# Patient Record
Sex: FEMALE | URBAN - METROPOLITAN AREA
[De-identification: names, ages, dates, MRNs, and addresses within clinical notes are randomized per-mention and may not be internally consistent; named-entity substitution may affect disease eponyms.]

---

## 2023-01-05 ENCOUNTER — APPOINTMENT (OUTPATIENT)
Dept: URBAN - METROPOLITAN AREA CLINIC 267 | Age: 83
Setting detail: DERMATOLOGY
End: 2023-01-05

## 2023-01-05 VITALS — HEIGHT: 65 IN | WEIGHT: 139 LBS

## 2023-01-05 DIAGNOSIS — D49.2 NEOPLASM OF UNSPECIFIED BEHAVIOR OF BONE, SOFT TISSUE, AND SKIN: ICD-10-CM

## 2023-01-05 DIAGNOSIS — L82.1 OTHER SEBORRHEIC KERATOSIS: ICD-10-CM

## 2023-01-05 PROCEDURE — 11102 TANGNTL BX SKIN SINGLE LES: CPT

## 2023-01-05 PROCEDURE — OTHER COUNSELING: OTHER

## 2023-01-05 PROCEDURE — 11103 TANGNTL BX SKIN EA SEP/ADDL: CPT

## 2023-01-05 PROCEDURE — OTHER BIOPSY BY SHAVE METHOD: OTHER

## 2023-01-05 PROCEDURE — 99202 OFFICE O/P NEW SF 15 MIN: CPT | Mod: 25

## 2023-01-05 PROCEDURE — OTHER MIPS QUALITY: OTHER

## 2023-01-05 ASSESSMENT — LOCATION ZONE DERM
LOCATION ZONE: SCALP
LOCATION ZONE: NOSE
LOCATION ZONE: FACE

## 2023-01-05 ASSESSMENT — LOCATION DETAILED DESCRIPTION DERM
LOCATION DETAILED: LEFT SUPERIOR OCCIPITAL SCALP
LOCATION DETAILED: NASAL SUPRATIP
LOCATION DETAILED: RIGHT SUPERIOR FOREHEAD

## 2023-01-05 ASSESSMENT — LOCATION SIMPLE DESCRIPTION DERM
LOCATION SIMPLE: RIGHT FOREHEAD
LOCATION SIMPLE: NOSE
LOCATION SIMPLE: LEFT OCCIPITAL SCALP

## 2023-01-05 NOTE — HPI: SKIN LESIONS
Is This A New Presentation, Or A Follow-Up?: Skin Lesion
Additional History: Pt stated spot broke open and has grown back hard. Pt states has a Hx of BCC.

## 2023-01-05 NOTE — PROCEDURE: BIOPSY BY SHAVE METHOD
Type Of Destruction Used: Curettage
Wound Care: Aquaphor
Destruction After The Procedure: No
Size Of Lesion In Cm: 0.7
Detail Level: Detailed
Electrodesiccation Text: The wound bed was treated with electrodesiccation after the biopsy was performed.
Was A Bandage Applied: Yes
Anesthesia Type: 1% lidocaine with epinephrine
Curettage Text: The wound bed was treated with curettage after the biopsy was performed.
Additional Anesthesia Volume In Cc (Will Not Render If 0): 0
Biopsy Type: H and E
Billing Type: Third-Party Bill
Notification Instructions: Patient will be notified of biopsy results. However, patient instructed to call the office if not contacted within 2 weeks.
Consent: Written consent was obtained and risks were reviewed including but not limited to scarring, infection, bleeding, scabbing, incomplete removal, nerve damage and allergy to anesthesia.
Dressing: bandage
Information: Selecting Yes will display possible errors in your note based on the variables you have selected. This validation is only offered as a suggestion for you. PLEASE NOTE THAT THE VALIDATION TEXT WILL BE REMOVED WHEN YOU FINALIZE YOUR NOTE. IF YOU WANT TO FAX A PRELIMINARY NOTE YOU WILL NEED TO TOGGLE THIS TO 'NO' IF YOU DO NOT WANT IT IN YOUR FAXED NOTE.
Hemostasis: Aluminum Chloride
Silver Nitrate Text: The wound bed was treated with silver nitrate after the biopsy was performed.
Size Of Lesion In Cm: 0.6
Cryotherapy Text: The wound bed was treated with cryotherapy after the biopsy was performed.
Electrodesiccation And Curettage Text: The wound bed was treated with electrodesiccation and curettage after the biopsy was performed.
Post-Care Instructions: I reviewed with the patient in detail post-care instructions. Patient is to keep the biopsy site dry overnight, and then apply Aquaphor twice daily until healed. Patient may apply hydrogen peroxide soaks to remove any crusting.
Anesthesia Volume In Cc (Will Not Render If 0): 0.5
Depth Of Biopsy: dermis
Biopsy Method: Personna blade

## 2023-02-13 ENCOUNTER — APPOINTMENT (OUTPATIENT)
Dept: URBAN - METROPOLITAN AREA CLINIC 268 | Age: 83
Setting detail: DERMATOLOGY
End: 2023-02-17

## 2023-02-13 PROBLEM — C44.311 BASAL CELL CARCINOMA OF SKIN OF NOSE: Status: ACTIVE | Noted: 2023-02-13

## 2023-02-13 PROCEDURE — 77300 RADIATION THERAPY DOSE PLAN: CPT

## 2023-02-13 PROCEDURE — G6001 ECHO GUIDANCE RADIOTHERAPY: HCPCS

## 2023-02-13 PROCEDURE — OTHER TREATMENT REGIMEN: OTHER

## 2023-02-13 PROCEDURE — 77280 THER RAD SIMULAJ FIELD SMPL: CPT

## 2023-02-13 PROCEDURE — OTHER SUPERFICIAL RADIATION TREATMENT: OTHER

## 2023-02-13 PROCEDURE — 77261 THER RADIOLOGY TX PLNG SMPL: CPT

## 2023-02-13 PROCEDURE — 77332 RADIATION TREATMENT AID(S): CPT

## 2023-02-13 PROCEDURE — 99213 OFFICE O/P EST LOW 20 MIN: CPT | Mod: 25

## 2023-02-13 NOTE — PROCEDURE: SUPERFICIAL RADIATION TREATMENT
Bill For Simulation: Yes - (Simple Simulation: 61061) Bill For Simulation?: Yes - (Simple Simulation: 19882)

## 2023-02-13 NOTE — PROCEDURE: SUPERFICIAL RADIATION TREATMENT
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

## 2023-02-13 NOTE — PROCEDURE: TREATMENT REGIMEN
Plan: Physician Orders: Initial Simulation Plan: Simulation with per Fraction Ultrasound\\nPlan: Per the request of Dr. Vincent, the patient was seen today for Superficial Radiation Therapy planning\\nrequiring initial simulation in preparation for treatment of specific diseased sites. Simulation is necessary to\\ndetermine the correct patient and treatment portal positioning, to deliver safe and effective radiation\\ntherapy. A high-frequency ultrasound image was acquired prior to treatment today for two- dimensional\\nevaluation of tumor volume and will be repeated per fraction for response to treatment, in addition, to\\ngeometric accuracy of field placement. Physician evaluation of the ultrasound tumor depth, width, and\\nbreadth will be ongoing through the course of treatment and is deemed medically necessary ensuring\\nefficacy of treatment and determine whether to proceed with delivery of therapeutic. Today’s image and\\nsetup were evaluated to determine the initiation of treatment. All appropriate blocking and treatment\\nparameters were verified by the radiation therapist and provider.\\nUltrasound depth is 2.32 mm.\\nPer Dr. Vincent, continued per fraction ultrasound guidance and simulation are required for field placement,\\nmeasurement of tumor depth, width and breadth, progress, and edema monitoring.\\nPer the request of Dr. Vincent, continuing medical physics review as per radiotherapy standard of care post every\\n5th fraction for the patient, including assessment of treatment parameters,  of dose\\ndelivery, and review of patient treatment documentation in support of the provider, is ordered, ensuring\\nefficacy and continued safe delivery of radiotherapy. Included in the physics check is a review of patient\\nsetup information, all pertinent simulation and treatment photograph(s) checks, prescription, dose\\ncalculation verification, per fraction dose charted correctly, elapsed days and treatment days correctly\\ncharted, cumulative dose correct, and review of any prescription changes. Continued medical physics review\\npost every 5th fraction of radiotherapy is requested by the provider for appropriate radiotherapy\\nmanagement and is deemed medically necessary and standard of care.
Detail Level: Zone

## 2023-02-14 ENCOUNTER — APPOINTMENT (OUTPATIENT)
Dept: URBAN - METROPOLITAN AREA CLINIC 268 | Age: 83
Setting detail: DERMATOLOGY
End: 2023-02-17

## 2023-02-14 PROBLEM — C44.311 BASAL CELL CARCINOMA OF SKIN OF NOSE: Status: ACTIVE | Noted: 2023-02-14

## 2023-02-14 PROCEDURE — OTHER SUPERFICIAL RADIATION TREATMENT: OTHER

## 2023-02-14 PROCEDURE — OTHER TREATMENT REGIMEN: OTHER

## 2023-02-14 PROCEDURE — 77280 THER RAD SIMULAJ FIELD SMPL: CPT

## 2023-02-14 PROCEDURE — G6001 ECHO GUIDANCE RADIOTHERAPY: HCPCS | Mod: XU

## 2023-02-14 PROCEDURE — 77401 RADIATION TX DELIVERY SUPFC: CPT

## 2023-02-14 NOTE — PROCEDURE: TREATMENT REGIMEN
Detail Level: Zone
Plan: Physician Orders: Radiotherapy Treatment Plan: Superficial Radiotherapy with Ultrasound\\nPlan: This patient has been treated today with image-guided superficial radiation therapy for non-melanoma\\nskin cancer. Written informed consent has been previously obtained from this patient for this treatment. This\\nconsent is documented in the patient's chart. The patient gave verbal consent to continue treatment today.\\nThe patient was treated with a specific radiation dose and setup as prescribed by the provider listed on this\\nvisit note. A Radiation Therapist performed administration of radiation under the supervision of a provider.\\nThe treatment parameters and cumulative dose are indicated above. Prior to administering the radiation, the\\npatient underwent a verification therapeutic radiology simulation-aided field setting defining relevant normal\\nand abnormal target anatomy and acquiring images with a separate and distinct diagnostic high-frequency\\nultrasound to delineate tissues and determine whether to proceed with delivery of therapeutic, in addition to\\nretrieve data necessary to develop an optimal radiation treatment process for the patient. The field\\nplacement simulation documents any change seen in overall tumor volume documented in the patient’s\\nrecord, allows the clinician to indicate any needed changes in the treatment plan and/or prescription,\\nprovides diagnostic evaluation as the basis for performing the therapeutic procedure, and clearly identifies\\nthe information needed to decide to proceed with the therapeutic procedure. This process includes\\nverification of the treatment port(s) and proper treatment positioning. All treatment ports were\\nphotographed within electronic medical records. The patient's lead blocking along with gross tumor volume\\nand margin was confirmed. Considering superficial radiotherapy is clinical in setup, this requires the physician\\nand radiation therapist to clarify the location interest being treated against initial images, ultrasound,\\npathology, and patient anatomy. Care was taken to ensure erwin treated were geometrically accurate and\\nproperly positioned using therapeutic radiology simulation-aided field setting verification per fraction. This\\nprocess is also utilized to determine if any prescription or setup changes are necessary. These steps are\\ntherefore medically necessary to ensure safe and effective administration of radiation. Ongoing therapeutic\\nradiology simulation-aided field setting verification is ordered throughout the course of therapy.\\nA high-frequency ultrasound image was acquired today for a two-dimensional evaluation of the tumor\\nvolume, depth, width, breadth, review of prior response to treatment, provide geometric accuracy of field\\nplacement, and determine whether to proceed with therapeutic delivery.\\nHigh frequency ultrasound depth is 2.32 mm, which is 0 mm in difference from previous imaging.\\nThe field placement and ultrasound imaging, per fraction, is separate and distinct from the initial simulation\\nand is an important task in providing safe administration of superficial radiation therapy. Physician evaluation\\nof the ultrasound information will be ongoing throughout the course of treatment and is deemed medically\\nnecessary to ensure the efficacy of treatment, whether to proceed with therapeutic delivery, and determine\\sherrie necessary changes. Today's images were evaluated for determination of continuation of treatment with\\nthe current plan or with necessary changes as appropriate. According to the review of verification\\ntherapeutic radiology simulation-aided field setting and imaging, no change is required.\\nAdditionally, the use of ultrasound visualization and targeted assessment allows the patient to be able to see\\nher cancer progress, encouraging the patient to complete and maintain compliance through the full\\ncourse of prescribed radiotherapy. Per Dr. Vincent, continued ultrasound guidance and therapeutic radiology\\nsimulation-aided field setting verification per fraction is required for field placement, measurement of tumor\\ndepth, tissues evaluation, progress, acute effect monitoring, and determination for therapeutic treatment\\ndelivery is appropriate.\\n

## 2023-02-14 NOTE — PROCEDURE: SUPERFICIAL RADIATION TREATMENT
Bill For Simulation?: Yes - (Simple Simulation: 36598) Bill For Simulation: Yes - (Simple Simulation: 85655)

## 2023-02-15 ENCOUNTER — APPOINTMENT (OUTPATIENT)
Dept: URBAN - METROPOLITAN AREA CLINIC 268 | Age: 83
Setting detail: DERMATOLOGY
End: 2023-02-17

## 2023-02-15 PROBLEM — C44.311 BASAL CELL CARCINOMA OF SKIN OF NOSE: Status: ACTIVE | Noted: 2023-02-15

## 2023-02-15 PROCEDURE — 77401 RADIATION TX DELIVERY SUPFC: CPT

## 2023-02-15 PROCEDURE — OTHER SUPERFICIAL RADIATION TREATMENT: OTHER

## 2023-02-15 PROCEDURE — OTHER TREATMENT REGIMEN: OTHER

## 2023-02-15 PROCEDURE — G6001 ECHO GUIDANCE RADIOTHERAPY: HCPCS | Mod: XU

## 2023-02-15 PROCEDURE — 77280 THER RAD SIMULAJ FIELD SMPL: CPT

## 2023-02-15 NOTE — PROCEDURE: SUPERFICIAL RADIATION TREATMENT
Bill For Simulation?: Yes - (Simple Simulation: 81270) Bill For Simulation: Yes - (Simple Simulation: 42631)

## 2023-02-15 NOTE — PROCEDURE: TREATMENT REGIMEN
Detail Level: Zone
Plan: Physician Orders: Radiotherapy Treatment Plan: Superficial Radiotherapy with Ultrasound\\nPlan: This patient has been treated today with image-guided superficial radiation therapy for non-melanoma\\nskin cancer. Written informed consent has been previously obtained from this patient for this treatment. This\\nconsent is documented in the patient's chart. The patient gave verbal consent to continue treatment today.\\nThe patient was treated with a specific radiation dose and setup as prescribed by the provider listed on this\\nvisit note. A Radiation Therapist performed administration of radiation under the supervision of a provider.\\nThe treatment parameters and cumulative dose are indicated above. Prior to administering the radiation, the\\npatient underwent a verification therapeutic radiology simulation-aided field setting defining relevant normal\\nand abnormal target anatomy and acquiring images with a separate and distinct diagnostic high-frequency\\nultrasound to delineate tissues and determine whether to proceed with delivery of therapeutic, in addition to\\nretrieve data necessary to develop an optimal radiation treatment process for the patient. The field\\nplacement simulation documents any change seen in overall tumor volume documented in the patient’s\\nrecord, allows the clinician to indicate any needed changes in the treatment plan and/or prescription,\\nprovides diagnostic evaluation as the basis for performing the therapeutic procedure, and clearly identifies\\nthe information needed to decide to proceed with the therapeutic procedure. This process includes\\nverification of the treatment port(s) and proper treatment positioning. All treatment ports were\\nphotographed within electronic medical records. The patient's lead blocking along with gross tumor volume\\nand margin was confirmed. Considering superficial radiotherapy is clinical in setup, this requires the physician\\nand radiation therapist to clarify the location interest being treated against initial images, ultrasound,\\npathology, and patient anatomy. Care was taken to ensure erwin treated were geometrically accurate and\\nproperly positioned using therapeutic radiology simulation-aided field setting verification per fraction. This\\nprocess is also utilized to determine if any prescription or setup changes are necessary. These steps are\\ntherefore medically necessary to ensure safe and effective administration of radiation. Ongoing therapeutic\\nradiology simulation-aided field setting verification is ordered throughout the course of therapy.\\nA high-frequency ultrasound image was acquired today for a two-dimensional evaluation of the tumor\\nvolume, depth, width, breadth, review of prior response to treatment, provide geometric accuracy of field\\nplacement, and determine whether to proceed with therapeutic delivery.\\nHigh frequency ultrasound depth is 3.22 mm, which is 0.90 mm in difference from previous imaging.\\nThe field placement and ultrasound imaging, per fraction, is separate and distinct from the initial simulation\\nand is an important task in providing safe administration of superficial radiation therapy. Physician evaluation\\nof the ultrasound information will be ongoing throughout the course of treatment and is deemed medically\\nnecessary to ensure the efficacy of treatment, whether to proceed with therapeutic delivery, and determine\\sherrie necessary changes. Today's images were evaluated for determination of continuation of treatment with\\nthe current plan or with necessary changes as appropriate. According to the review of verification\\ntherapeutic radiology simulation-aided field setting and imaging, no change is required.\\nAdditionally, the use of ultrasound visualization and targeted assessment allows the patient to be able to see\\nher cancer progress, encouraging the patient to complete and maintain compliance through the full\\ncourse of prescribed radiotherapy. Per Dr. Vincent, continued ultrasound guidance and therapeutic radiology\\nsimulation-aided field setting verification per fraction is required for field placement, measurement of tumor\\ndepth, tissues evaluation, progress, acute effect monitoring, and determination for therapeutic treatment\\ndelivery is appropriate.\\n

## 2023-02-21 ENCOUNTER — APPOINTMENT (OUTPATIENT)
Dept: URBAN - METROPOLITAN AREA CLINIC 268 | Age: 83
Setting detail: DERMATOLOGY
End: 2023-02-21

## 2023-02-21 PROBLEM — C44.311 BASAL CELL CARCINOMA OF SKIN OF NOSE: Status: ACTIVE | Noted: 2023-02-21

## 2023-02-21 PROCEDURE — 77280 THER RAD SIMULAJ FIELD SMPL: CPT

## 2023-02-21 PROCEDURE — OTHER TREATMENT REGIMEN: OTHER

## 2023-02-21 PROCEDURE — OTHER SUPERFICIAL RADIATION TREATMENT: OTHER

## 2023-02-21 PROCEDURE — G6001 ECHO GUIDANCE RADIOTHERAPY: HCPCS | Mod: XU

## 2023-02-21 PROCEDURE — 77401 RADIATION TX DELIVERY SUPFC: CPT

## 2023-02-21 NOTE — PROCEDURE: TREATMENT REGIMEN
Plan: Physician Orders: Radiotherapy Treatment Plan: Superficial Radiotherapy with Ultrasound\\nPlan: This patient has been treated today with image-guided superficial radiation therapy for non-melanoma\\nskin cancer. Written informed consent has been previously obtained from this patient for this treatment. This\\nconsent is documented in the patient's chart. The patient gave verbal consent to continue treatment today.\\nThe patient was treated with a specific radiation dose and setup as prescribed by the provider listed on this\\nvisit note. A Radiation Therapist performed administration of radiation under the supervision of a provider.\\nThe treatment parameters and cumulative dose are indicated above. Prior to administering the radiation, the\\npatient underwent a verification therapeutic radiology simulation-aided field setting defining relevant normal\\nand abnormal target anatomy and acquiring images with a separate and distinct diagnostic high-frequency\\nultrasound to delineate tissues and determine whether to proceed with delivery of therapeutic, in addition to\\nretrieve data necessary to develop an optimal radiation treatment process for the patient. The field\\nplacement simulation documents any change seen in overall tumor volume documented in the patient’s\\nrecord, allows the clinician to indicate any needed changes in the treatment plan and/or prescription,\\nprovides diagnostic evaluation as the basis for performing the therapeutic procedure, and clearly identifies\\nthe information needed to decide to proceed with the therapeutic procedure. This process includes\\nverification of the treatment port(s) and proper treatment positioning. All treatment ports were\\nphotographed within electronic medical records. The patient's lead blocking along with gross tumor volume\\nand margin was confirmed. Considering superficial radiotherapy is clinical in setup, this requires the physician\\nand radiation therapist to clarify the location interest being treated against initial images, ultrasound,\\npathology, and patient anatomy. Care was taken to ensure erwin treated were geometrically accurate and\\nproperly positioned using therapeutic radiology simulation-aided field setting verification per fraction. This\\nprocess is also utilized to determine if any prescription or setup changes are necessary. These steps are\\ntherefore medically necessary to ensure safe and effective administration of radiation. Ongoing therapeutic\\nradiology simulation-aided field setting verification is ordered throughout the course of therapy.\\nA high-frequency ultrasound image was acquired today for a two-dimensional evaluation of the tumor\\nvolume, depth, width, breadth, review of prior response to treatment, provide geometric accuracy of field\\nplacement, and determine whether to proceed with therapeutic delivery.\\nHigh frequency ultrasound depth is 2.90 mm, which is 0.32 mm in difference from previous imaging.\\nThe field placement and ultrasound imaging, per fraction, is separate and distinct from the initial simulation\\nand is an important task in providing safe administration of superficial radiation therapy. Physician evaluation\\nof the ultrasound information will be ongoing throughout the course of treatment and is deemed medically\\nnecessary to ensure the efficacy of treatment, whether to proceed with therapeutic delivery, and determine\\sherrie necessary changes. Today's images were evaluated for determination of continuation of treatment with\\nthe current plan or with necessary changes as appropriate. According to the review of verification\\ntherapeutic radiology simulation-aided field setting and imaging, no change is required.\\nAdditionally, the use of ultrasound visualization and targeted assessment allows the patient to be able to see\\nher cancer progress, encouraging the patient to complete and maintain compliance through the full\\ncourse of prescribed radiotherapy. Per Dr. Vincent, continued ultrasound guidance and therapeutic radiology\\nsimulation-aided field setting verification per fraction is required for field placement, measurement of tumor\\ndepth, tissues evaluation, progress, acute effect monitoring, and determination for therapeutic treatment\\ndelivery is appropriate.\\n
Detail Level: Zone

## 2023-02-21 NOTE — PROCEDURE: SUPERFICIAL RADIATION TREATMENT
Bill For Simulation: Yes - (Simple Simulation: 39136) Bill For Simulation?: Yes - (Simple Simulation: 37509)

## 2023-02-22 ENCOUNTER — APPOINTMENT (OUTPATIENT)
Dept: URBAN - METROPOLITAN AREA CLINIC 268 | Age: 83
Setting detail: DERMATOLOGY
End: 2023-02-22

## 2023-02-22 PROBLEM — C44.311 BASAL CELL CARCINOMA OF SKIN OF NOSE: Status: ACTIVE | Noted: 2023-02-22

## 2023-02-22 PROCEDURE — G6001 ECHO GUIDANCE RADIOTHERAPY: HCPCS | Mod: XU

## 2023-02-22 PROCEDURE — OTHER SUPERFICIAL RADIATION TREATMENT: OTHER

## 2023-02-22 PROCEDURE — OTHER TREATMENT REGIMEN: OTHER

## 2023-02-22 PROCEDURE — 77401 RADIATION TX DELIVERY SUPFC: CPT

## 2023-02-22 PROCEDURE — 77280 THER RAD SIMULAJ FIELD SMPL: CPT

## 2023-02-22 NOTE — PROCEDURE: SUPERFICIAL RADIATION TREATMENT
Bill For Simulation: Yes - (Simple Simulation: 16823) Bill For Simulation?: Yes - (Simple Simulation: 97279)

## 2023-02-22 NOTE — PROCEDURE: TREATMENT REGIMEN
Detail Level: Zone
Plan: Physician Orders: Radiotherapy Treatment Plan: Superficial Radiotherapy with Ultrasound\\nPlan: This patient has been treated today with image-guided superficial radiation therapy for non-melanoma\\nskin cancer. Written informed consent has been previously obtained from this patient for this treatment. This\\nconsent is documented in the patient's chart. The patient gave verbal consent to continue treatment today.\\nThe patient was treated with a specific radiation dose and setup as prescribed by the provider listed on this\\nvisit note. A Radiation Therapist performed administration of radiation under the supervision of a provider.\\nThe treatment parameters and cumulative dose are indicated above. Prior to administering the radiation, the\\npatient underwent a verification therapeutic radiology simulation-aided field setting defining relevant normal\\nand abnormal target anatomy and acquiring images with a separate and distinct diagnostic high-frequency\\nultrasound to delineate tissues and determine whether to proceed with delivery of therapeutic, in addition to\\nretrieve data necessary to develop an optimal radiation treatment process for the patient. The field\\nplacement simulation documents any change seen in overall tumor volume documented in the patient’s\\nrecord, allows the clinician to indicate any needed changes in the treatment plan and/or prescription,\\nprovides diagnostic evaluation as the basis for performing the therapeutic procedure, and clearly identifies\\nthe information needed to decide to proceed with the therapeutic procedure. This process includes\\nverification of the treatment port(s) and proper treatment positioning. All treatment ports were\\nphotographed within electronic medical records. The patient's lead blocking along with gross tumor volume\\nand margin was confirmed. Considering superficial radiotherapy is clinical in setup, this requires the physician\\nand radiation therapist to clarify the location interest being treated against initial images, ultrasound,\\npathology, and patient anatomy. Care was taken to ensure erwin treated were geometrically accurate and\\nproperly positioned using therapeutic radiology simulation-aided field setting verification per fraction. This\\nprocess is also utilized to determine if any prescription or setup changes are necessary. These steps are\\ntherefore medically necessary to ensure safe and effective administration of radiation. Ongoing therapeutic\\nradiology simulation-aided field setting verification is ordered throughout the course of therapy.\\nA high-frequency ultrasound image was acquired today for a two-dimensional evaluation of the tumor\\nvolume, depth, width, breadth, review of prior response to treatment, provide geometric accuracy of field\\nplacement, and determine whether to proceed with therapeutic delivery.\\nHigh frequency ultrasound depth is 2.76 mm, which is 0.14 mm in difference from previous imaging.\\nThe field placement and ultrasound imaging, per fraction, is separate and distinct from the initial simulation\\nand is an important task in providing safe administration of superficial radiation therapy. Physician evaluation\\nof the ultrasound information will be ongoing throughout the course of treatment and is deemed medically\\nnecessary to ensure the efficacy of treatment, whether to proceed with therapeutic delivery, and determine\\sherrie necessary changes. Today's images were evaluated for determination of continuation of treatment with\\nthe current plan or with necessary changes as appropriate. According to the review of verification\\ntherapeutic radiology simulation-aided field setting and imaging, no change is required.\\nAdditionally, the use of ultrasound visualization and targeted assessment allows the patient to be able to see\\nher cancer progress, encouraging the patient to complete and maintain compliance through the full\\ncourse of prescribed radiotherapy. Per Dr. Vincent, continued ultrasound guidance and therapeutic radiology\\nsimulation-aided field setting verification per fraction is required for field placement, measurement of tumor\\ndepth, tissues evaluation, progress, acute effect monitoring, and determination for therapeutic treatment\\ndelivery is appropriate.\\n

## 2023-02-23 ENCOUNTER — APPOINTMENT (OUTPATIENT)
Dept: URBAN - METROPOLITAN AREA CLINIC 268 | Age: 83
Setting detail: DERMATOLOGY
End: 2023-02-24

## 2023-02-23 PROBLEM — C44.311 BASAL CELL CARCINOMA OF SKIN OF NOSE: Status: ACTIVE | Noted: 2023-02-23

## 2023-02-23 PROCEDURE — 77280 THER RAD SIMULAJ FIELD SMPL: CPT

## 2023-02-23 PROCEDURE — G6001 ECHO GUIDANCE RADIOTHERAPY: HCPCS | Mod: XU

## 2023-02-23 PROCEDURE — 77401 RADIATION TX DELIVERY SUPFC: CPT

## 2023-02-23 PROCEDURE — 77427 RADIATION TX MANAGEMENT X5: CPT

## 2023-02-23 PROCEDURE — OTHER TREATMENT REGIMEN: OTHER

## 2023-02-23 PROCEDURE — OTHER SUPERFICIAL RADIATION TREATMENT: OTHER

## 2023-02-23 NOTE — PROCEDURE: TREATMENT REGIMEN
Detail Level: Zone
Plan: Physician Orders: Radiotherapy Treatment Plan: Superficial Radiotherapy with Ultrasound\\nPlan: This patient has been treated today with image-guided superficial radiation therapy for non-melanoma\\nskin cancer. Written informed consent has been previously obtained from this patient for this treatment. This\\nconsent is documented in the patient's chart. The patient gave verbal consent to continue treatment today.\\nThe patient was treated with a specific radiation dose and setup as prescribed by the provider listed on this\\nvisit note. A Radiation Therapist performed administration of radiation under the supervision of a provider.\\nThe treatment parameters and cumulative dose are indicated above. Prior to administering the radiation, the\\npatient underwent a verification therapeutic radiology simulation-aided field setting defining relevant normal\\nand abnormal target anatomy and acquiring images with a separate and distinct diagnostic high-frequency\\nultrasound to delineate tissues and determine whether to proceed with delivery of therapeutic, in addition to\\nretrieve data necessary to develop an optimal radiation treatment process for the patient. The field\\nplacement simulation documents any change seen in overall tumor volume documented in the patient’s\\nrecord, allows the clinician to indicate any needed changes in the treatment plan and/or prescription,\\nprovides diagnostic evaluation as the basis for performing the therapeutic procedure, and clearly identifies\\nthe information needed to decide to proceed with the therapeutic procedure. This process includes\\nverification of the treatment port(s) and proper treatment positioning. All treatment ports were\\nphotographed within electronic medical records. The patient's lead blocking along with gross tumor volume\\nand margin was confirmed. Considering superficial radiotherapy is clinical in setup, this requires the physician\\nand radiation therapist to clarify the location interest being treated against initial images, ultrasound,\\npathology, and patient anatomy. Care was taken to ensure erwin treated were geometrically accurate and\\nproperly positioned using therapeutic radiology simulation-aided field setting verification per fraction. This\\nprocess is also utilized to determine if any prescription or setup changes are necessary. These steps are\\ntherefore medically necessary to ensure safe and effective administration of radiation. Ongoing therapeutic\\nradiology simulation-aided field setting verification is ordered throughout the course of therapy.\\nA high-frequency ultrasound image was acquired today for a two-dimensional evaluation of the tumor\\nvolume, depth, width, breadth, review of prior response to treatment, provide geometric accuracy of field\\nplacement, and determine whether to proceed with therapeutic delivery.\\nHigh frequency ultrasound depth is 2.64 mm, which is 0.12 mm in difference from previous imaging.\\nThe field placement and ultrasound imaging, per fraction, is separate and distinct from the initial simulation\\nand is an important task in providing safe administration of superficial radiation therapy. Physician evaluation\\nof the ultrasound information will be ongoing throughout the course of treatment and is deemed medically\\nnecessary to ensure the efficacy of treatment, whether to proceed with therapeutic delivery, and determine\\sherrie necessary changes. Today's images were evaluated for determination of continuation of treatment with\\nthe current plan or with necessary changes as appropriate. According to the review of verification\\ntherapeutic radiology simulation-aided field setting and imaging, no change is required.\\nAdditionally, the use of ultrasound visualization and targeted assessment allows the patient to be able to see\\nher cancer progress, encouraging the patient to complete and maintain compliance through the full\\ncourse of prescribed radiotherapy. Per Dr. Vincent, continued ultrasound guidance and therapeutic radiology\\nsimulation-aided field setting verification per fraction is required for field placement, measurement of tumor\\ndepth, tissues evaluation, progress, acute effect monitoring, and determination for therapeutic treatment\\ndelivery is appropriate.\\n\\nPhysician Orders: Plan: On Treatment Visit (OTV) with Ultrasound\\nPlan: The patient is undergoing superficial radiation therapy for skin cancer and presents for weekly\\nevaluation and management. Per protocol and as documented on the flow sheet, the patient was questioned\\cristal to subjective redness, pruritus, pain, drainage, fatigue, or any other symptoms. Objectively, the radiation\\narea was evaluated with regards to erythema, atrophy, scale, crusting, erosion, ulceration, edema, purpura,\\ntenderness, warmth, drainage, and any other findings. The plan was extensively reviewed including dose and\\ndosing schedule. The simulation and clinical setup were also reviewed as were external and any internal\\nshields and based on this review the appropriateness and sufficiency of treatment was determined.\\nA high-frequency ultrasound image was acquired today for a two-dimensional evaluation of the tumor\\nvolume, depth, width, breadth, review of prior response to treatment, provide geometric accuracy of field\\nplacement, and determine whether to proceed with therapeutic delivery.\\nHigh frequency ultrasound depth is 2.64 mm, which is 0.12 mm in difference from previous imaging.\\nPer Dr. Vincent, continued ultrasound guidance and therapeutic radiology simulation-aided field setting\\nverification per fraction is required for field placement, measurement of tumor depth, tissues evaluation,\\nprogress, acute effect monitoring, and determination for therapeutic treatment delivery is appropriate.

## 2023-02-23 NOTE — PROCEDURE: SUPERFICIAL RADIATION TREATMENT
Bill For Simulation?: Yes - (Simple Simulation: [id number]) Bill For Simulation: Yes - (Simple Simulation: 40388)

## 2023-02-25 ENCOUNTER — APPOINTMENT (OUTPATIENT)
Dept: URBAN - METROPOLITAN AREA CLINIC 268 | Age: 83
Setting detail: DERMATOLOGY
End: 2023-04-11

## 2023-02-25 PROCEDURE — 77336 RADIATION PHYSICS CONSULT: CPT

## 2023-02-28 ENCOUNTER — APPOINTMENT (OUTPATIENT)
Dept: URBAN - METROPOLITAN AREA CLINIC 268 | Age: 83
Setting detail: DERMATOLOGY
End: 2023-03-02

## 2023-02-28 PROBLEM — C44.311 BASAL CELL CARCINOMA OF SKIN OF NOSE: Status: ACTIVE | Noted: 2023-02-28

## 2023-02-28 PROCEDURE — 77280 THER RAD SIMULAJ FIELD SMPL: CPT

## 2023-02-28 PROCEDURE — G6001 ECHO GUIDANCE RADIOTHERAPY: HCPCS | Mod: XU

## 2023-02-28 PROCEDURE — OTHER TREATMENT REGIMEN: OTHER

## 2023-02-28 PROCEDURE — OTHER SUPERFICIAL RADIATION TREATMENT: OTHER

## 2023-02-28 PROCEDURE — 77401 RADIATION TX DELIVERY SUPFC: CPT

## 2023-02-28 NOTE — PROCEDURE: TREATMENT REGIMEN
Detail Level: Zone
Plan: Physician Orders: Radiotherapy Treatment Plan: Superficial Radiotherapy with Ultrasound\\nPlan: This patient has been treated today with image-guided superficial radiation therapy for non-melanoma\\nskin cancer. Written informed consent has been previously obtained from this patient for this treatment. This\\nconsent is documented in the patient's chart. The patient gave verbal consent to continue treatment today.\\nThe patient was treated with a specific radiation dose and setup as prescribed by the provider listed on this\\nvisit note. A Radiation Therapist performed administration of radiation under the supervision of a provider.\\nThe treatment parameters and cumulative dose are indicated above. Prior to administering the radiation, the\\npatient underwent a verification therapeutic radiology simulation-aided field setting defining relevant normal\\nand abnormal target anatomy and acquiring images with a separate and distinct diagnostic high-frequency\\nultrasound to delineate tissues and determine whether to proceed with delivery of therapeutic, in addition to\\nretrieve data necessary to develop an optimal radiation treatment process for the patient. The field\\nplacement simulation documents any change seen in overall tumor volume documented in the patient’s\\nrecord, allows the clinician to indicate any needed changes in the treatment plan and/or prescription,\\nprovides diagnostic evaluation as the basis for performing the therapeutic procedure, and clearly identifies\\nthe information needed to decide to proceed with the therapeutic procedure. This process includes\\nverification of the treatment port(s) and proper treatment positioning. All treatment ports were\\nphotographed within electronic medical records. The patient's lead blocking along with gross tumor volume\\nand margin was confirmed. Considering superficial radiotherapy is clinical in setup, this requires the physician\\nand radiation therapist to clarify the location interest being treated against initial images, ultrasound,\\npathology, and patient anatomy. Care was taken to ensure erwin treated were geometrically accurate and\\nproperly positioned using therapeutic radiology simulation-aided field setting verification per fraction. This\\nprocess is also utilized to determine if any prescription or setup changes are necessary. These steps are\\ntherefore medically necessary to ensure safe and effective administration of radiation. Ongoing therapeutic\\nradiology simulation-aided field setting verification is ordered throughout the course of therapy.\\nA high-frequency ultrasound image was acquired today for a two-dimensional evaluation of the tumor\\nvolume, depth, width, breadth, review of prior response to treatment, provide geometric accuracy of field\\nplacement, and determine whether to proceed with therapeutic delivery.\\nHigh frequency ultrasound depth is 2.30 mm, which is -0.34 mm in difference from previous imaging.\\nThe field placement and ultrasound imaging, per fraction, is separate and distinct from the initial simulation\\nand is an important task in providing safe administration of superficial radiation therapy. Physician evaluation\\nof the ultrasound information will be ongoing throughout the course of treatment and is deemed medically\\nnecessary to ensure the efficacy of treatment, whether to proceed with therapeutic delivery, and determine\\sherrie necessary changes. Today's images were evaluated for determination of continuation of treatment with\\nthe current plan or with necessary changes as appropriate. According to the review of verification\\ntherapeutic radiology simulation-aided field setting and imaging, no change is required.\\nAdditionally, the use of ultrasound visualization and targeted assessment allows the patient to be able to see\\nher cancer progress, encouraging the patient to complete and maintain compliance through the full\\ncourse of prescribed radiotherapy. Per Dr. Vincent, continued ultrasound guidance and therapeutic radiology\\nsimulation-aided field setting verification per fraction is required for field placement, measurement of tumor\\ndepth, tissues evaluation, progress, acute effect monitoring, and determination for therapeutic treatment\\ndelivery is appropriate.\\n

## 2023-02-28 NOTE — PROCEDURE: SUPERFICIAL RADIATION TREATMENT
Bill For Simulation?: Yes - (Simple Simulation: 96306) Bill For Simulation: Yes - (Simple Simulation: 99308)

## 2023-03-01 ENCOUNTER — APPOINTMENT (OUTPATIENT)
Dept: URBAN - METROPOLITAN AREA CLINIC 268 | Age: 83
Setting detail: DERMATOLOGY
End: 2023-03-01

## 2023-03-01 PROBLEM — C44.311 BASAL CELL CARCINOMA OF SKIN OF NOSE: Status: ACTIVE | Noted: 2023-03-01

## 2023-03-01 PROCEDURE — G6001 ECHO GUIDANCE RADIOTHERAPY: HCPCS | Mod: XU

## 2023-03-01 PROCEDURE — OTHER TREATMENT REGIMEN: OTHER

## 2023-03-01 PROCEDURE — 77401 RADIATION TX DELIVERY SUPFC: CPT

## 2023-03-01 PROCEDURE — 77280 THER RAD SIMULAJ FIELD SMPL: CPT

## 2023-03-01 PROCEDURE — OTHER SUPERFICIAL RADIATION TREATMENT: OTHER

## 2023-03-01 NOTE — PROCEDURE: SUPERFICIAL RADIATION TREATMENT
Bill For Simulation?: Yes - (Simple Simulation: [id number]) Bill For Simulation: Yes - (Simple Simulation: 71309)

## 2023-03-02 ENCOUNTER — APPOINTMENT (OUTPATIENT)
Dept: URBAN - METROPOLITAN AREA CLINIC 268 | Age: 83
Setting detail: DERMATOLOGY
End: 2023-03-06

## 2023-03-02 PROBLEM — C44.311 BASAL CELL CARCINOMA OF SKIN OF NOSE: Status: ACTIVE | Noted: 2023-03-02

## 2023-03-02 PROCEDURE — 77280 THER RAD SIMULAJ FIELD SMPL: CPT

## 2023-03-02 PROCEDURE — 77401 RADIATION TX DELIVERY SUPFC: CPT

## 2023-03-02 PROCEDURE — OTHER TREATMENT REGIMEN: OTHER

## 2023-03-02 PROCEDURE — OTHER SUPERFICIAL RADIATION TREATMENT: OTHER

## 2023-03-02 PROCEDURE — G6001 ECHO GUIDANCE RADIOTHERAPY: HCPCS | Mod: XU

## 2023-03-02 NOTE — PROCEDURE: SUPERFICIAL RADIATION TREATMENT
Bill For Simulation: Yes - (Simple Simulation: 83830) Bill For Simulation?: Yes - (Simple Simulation: 19101)

## 2023-03-02 NOTE — PROCEDURE: TREATMENT REGIMEN
Detail Level: Zone
Plan: Physician Orders: Radiotherapy Treatment Plan: Superficial Radiotherapy with Ultrasound\\nPlan: This patient has been treated today with image-guided superficial radiation therapy for non-melanoma\\nskin cancer. Written informed consent has been previously obtained from this patient for this treatment. This\\nconsent is documented in the patient's chart. The patient gave verbal consent to continue treatment today.\\nThe patient was treated with a specific radiation dose and setup as prescribed by the provider listed on this\\nvisit note. A Radiation Therapist performed administration of radiation under the supervision of a provider.\\nThe treatment parameters and cumulative dose are indicated above. Prior to administering the radiation, the\\npatient underwent a verification therapeutic radiology simulation-aided field setting defining relevant normal\\nand abnormal target anatomy and acquiring images with a separate and distinct diagnostic high-frequency\\nultrasound to delineate tissues and determine whether to proceed with delivery of therapeutic, in addition to\\nretrieve data necessary to develop an optimal radiation treatment process for the patient. The field\\nplacement simulation documents any change seen in overall tumor volume documented in the patient’s\\nrecord, allows the clinician to indicate any needed changes in the treatment plan and/or prescription,\\nprovides diagnostic evaluation as the basis for performing the therapeutic procedure, and clearly identifies\\nthe information needed to decide to proceed with the therapeutic procedure. This process includes\\nverification of the treatment port(s) and proper treatment positioning. All treatment ports were\\nphotographed within electronic medical records. The patient's lead blocking along with gross tumor volume\\nand margin was confirmed. Considering superficial radiotherapy is clinical in setup, this requires the physician\\nand radiation therapist to clarify the location interest being treated against initial images, ultrasound,\\npathology, and patient anatomy. Care was taken to ensure erwin treated were geometrically accurate and\\nproperly positioned using therapeutic radiology simulation-aided field setting verification per fraction. This\\nprocess is also utilized to determine if any prescription or setup changes are necessary. These steps are\\ntherefore medically necessary to ensure safe and effective administration of radiation. Ongoing therapeutic\\nradiology simulation-aided field setting verification is ordered throughout the course of therapy.\\nA high-frequency ultrasound image was acquired today for a two-dimensional evaluation of the tumor\\nvolume, depth, width, breadth, review of prior response to treatment, provide geometric accuracy of field\\nplacement, and determine whether to proceed with therapeutic delivery.\\nHigh frequency ultrasound depth is 2.44 mm, which is +0.19 mm in difference from previous imaging.\\nThe field placement and ultrasound imaging, per fraction, is separate and distinct from the initial simulation\\nand is an important task in providing safe administration of superficial radiation therapy. Physician evaluation\\nof the ultrasound information will be ongoing throughout the course of treatment and is deemed medically\\nnecessary to ensure the efficacy of treatment, whether to proceed with therapeutic delivery, and determine\\sherrie necessary changes. Today's images were evaluated for determination of continuation of treatment with\\nthe current plan or with necessary changes as appropriate. According to the review of verification\\ntherapeutic radiology simulation-aided field setting and imaging, no change is required.\\nAdditionally, the use of ultrasound visualization and targeted assessment allows the patient to be able to see\\nher cancer progress, encouraging the patient to complete and maintain compliance through the full\\ncourse of prescribed radiotherapy. Per Dr. Vincent, continued ultrasound guidance and therapeutic radiology\\nsimulation-aided field setting verification per fraction is required for field placement, measurement of tumor\\ndepth, tissues evaluation, progress, acute effect monitoring, and determination for therapeutic treatment\\ndelivery is appropriate.\\n

## 2023-03-07 ENCOUNTER — APPOINTMENT (OUTPATIENT)
Dept: URBAN - METROPOLITAN AREA CLINIC 268 | Age: 83
Setting detail: DERMATOLOGY
End: 2023-03-08

## 2023-03-07 PROBLEM — C44.311 BASAL CELL CARCINOMA OF SKIN OF NOSE: Status: ACTIVE | Noted: 2023-03-07

## 2023-03-07 PROCEDURE — OTHER TREATMENT REGIMEN: OTHER

## 2023-03-07 PROCEDURE — OTHER SUPERFICIAL RADIATION TREATMENT: OTHER

## 2023-03-07 PROCEDURE — 77280 THER RAD SIMULAJ FIELD SMPL: CPT

## 2023-03-07 PROCEDURE — 77401 RADIATION TX DELIVERY SUPFC: CPT

## 2023-03-07 PROCEDURE — G6001 ECHO GUIDANCE RADIOTHERAPY: HCPCS | Mod: XU

## 2023-03-07 NOTE — PROCEDURE: SUPERFICIAL RADIATION TREATMENT
Bill For Simulation: Yes - (Simple Simulation: 32810) Bill For Simulation?: Yes - (Simple Simulation: 09449)

## 2023-03-07 NOTE — PROCEDURE: TREATMENT REGIMEN
Detail Level: Zone
Plan: Physician Orders: Radiotherapy Treatment Plan: Superficial Radiotherapy with Ultrasound\\nPlan: This patient has been treated today with image-guided superficial radiation therapy for non-melanoma\\nskin cancer. Written informed consent has been previously obtained from this patient for this treatment. This\\nconsent is documented in the patient's chart. The patient gave verbal consent to continue treatment today.\\nThe patient was treated with a specific radiation dose and setup as prescribed by the provider listed on this\\nvisit note. A Radiation Therapist performed administration of radiation under the supervision of a provider.\\nThe treatment parameters and cumulative dose are indicated above. Prior to administering the radiation, the\\npatient underwent a verification therapeutic radiology simulation-aided field setting defining relevant normal\\nand abnormal target anatomy and acquiring images with a separate and distinct diagnostic high-frequency\\nultrasound to delineate tissues and determine whether to proceed with delivery of therapeutic, in addition to\\nretrieve data necessary to develop an optimal radiation treatment process for the patient. The field\\nplacement simulation documents any change seen in overall tumor volume documented in the patient’s\\nrecord, allows the clinician to indicate any needed changes in the treatment plan and/or prescription,\\nprovides diagnostic evaluation as the basis for performing the therapeutic procedure, and clearly identifies\\nthe information needed to decide to proceed with the therapeutic procedure. This process includes\\nverification of the treatment port(s) and proper treatment positioning. All treatment ports were\\nphotographed within electronic medical records. The patient's lead blocking along with gross tumor volume\\nand margin was confirmed. Considering superficial radiotherapy is clinical in setup, this requires the physician\\nand radiation therapist to clarify the location interest being treated against initial images, ultrasound,\\npathology, and patient anatomy. Care was taken to ensure erwin treated were geometrically accurate and\\nproperly positioned using therapeutic radiology simulation-aided field setting verification per fraction. This\\nprocess is also utilized to determine if any prescription or setup changes are necessary. These steps are\\ntherefore medically necessary to ensure safe and effective administration of radiation. Ongoing therapeutic\\nradiology simulation-aided field setting verification is ordered throughout the course of therapy.\\nA high-frequency ultrasound image was acquired today for a two-dimensional evaluation of the tumor\\nvolume, depth, width, breadth, review of prior response to treatment, provide geometric accuracy of field\\nplacement, and determine whether to proceed with therapeutic delivery.\\nHigh frequency ultrasound depth is 2.63 mm, which is +0.19 mm in difference from previous imaging.\\nThe field placement and ultrasound imaging, per fraction, is separate and distinct from the initial simulation\\nand is an important task in providing safe administration of superficial radiation therapy. Physician evaluation\\nof the ultrasound information will be ongoing throughout the course of treatment and is deemed medically\\nnecessary to ensure the efficacy of treatment, whether to proceed with therapeutic delivery, and determine\\sherrie necessary changes. Today's images were evaluated for determination of continuation of treatment with\\nthe current plan or with necessary changes as appropriate. According to the review of verification\\ntherapeutic radiology simulation-aided field setting and imaging, no change is required.\\nAdditionally, the use of ultrasound visualization and targeted assessment allows the patient to be able to see\\nher cancer progress, encouraging the patient to complete and maintain compliance through the full\\ncourse of prescribed radiotherapy. Per Dr. Vincent, continued ultrasound guidance and therapeutic radiology\\nsimulation-aided field setting verification per fraction is required for field placement, measurement of tumor\\ndepth, tissues evaluation, progress, acute effect monitoring, and determination for therapeutic treatment\\ndelivery is appropriate.\\n

## 2023-03-08 ENCOUNTER — APPOINTMENT (OUTPATIENT)
Dept: URBAN - METROPOLITAN AREA CLINIC 268 | Age: 83
Setting detail: DERMATOLOGY
End: 2023-03-08

## 2023-03-08 PROBLEM — C44.311 BASAL CELL CARCINOMA OF SKIN OF NOSE: Status: ACTIVE | Noted: 2023-03-08

## 2023-03-08 PROCEDURE — 77280 THER RAD SIMULAJ FIELD SMPL: CPT

## 2023-03-08 PROCEDURE — OTHER TREATMENT REGIMEN: OTHER

## 2023-03-08 PROCEDURE — G6001 ECHO GUIDANCE RADIOTHERAPY: HCPCS | Mod: XU

## 2023-03-08 PROCEDURE — 77427 RADIATION TX MANAGEMENT X5: CPT

## 2023-03-08 PROCEDURE — 77401 RADIATION TX DELIVERY SUPFC: CPT

## 2023-03-08 PROCEDURE — OTHER SUPERFICIAL RADIATION TREATMENT: OTHER

## 2023-03-08 NOTE — PROCEDURE: SUPERFICIAL RADIATION TREATMENT
Bill For Simulation?: Yes - (Simple Simulation: 81751) Bill For Simulation: Yes - (Simple Simulation: 61553)

## 2023-03-08 NOTE — PROCEDURE: TREATMENT REGIMEN
Detail Level: Zone
Plan: Physician Orders: Radiotherapy Treatment Plan: Superficial Radiotherapy with Ultrasound\\nPlan: This patient has been treated today with image-guided superficial radiation therapy for non-melanoma\\nskin cancer. Written informed consent has been previously obtained from this patient for this treatment. This\\nconsent is documented in the patient's chart. The patient gave verbal consent to continue treatment today.\\nThe patient was treated with a specific radiation dose and setup as prescribed by the provider listed on this\\nvisit note. A Radiation Therapist performed administration of radiation under the supervision of a provider.\\nThe treatment parameters and cumulative dose are indicated above. Prior to administering the radiation, the\\npatient underwent a verification therapeutic radiology simulation-aided field setting defining relevant normal\\nand abnormal target anatomy and acquiring images with a separate and distinct diagnostic high-frequency\\nultrasound to delineate tissues and determine whether to proceed with delivery of therapeutic, in addition to\\nretrieve data necessary to develop an optimal radiation treatment process for the patient. The field\\nplacement simulation documents any change seen in overall tumor volume documented in the patient’s\\nrecord, allows the clinician to indicate any needed changes in the treatment plan and/or prescription,\\nprovides diagnostic evaluation as the basis for performing the therapeutic procedure, and clearly identifies\\nthe information needed to decide to proceed with the therapeutic procedure. This process includes\\nverification of the treatment port(s) and proper treatment positioning. All treatment ports were\\nphotographed within electronic medical records. The patient's lead blocking along with gross tumor volume\\nand margin was confirmed. Considering superficial radiotherapy is clinical in setup, this requires the physician\\nand radiation therapist to clarify the location interest being treated against initial images, ultrasound,\\npathology, and patient anatomy. Care was taken to ensure erwin treated were geometrically accurate and\\nproperly positioned using therapeutic radiology simulation-aided field setting verification per fraction. This\\nprocess is also utilized to determine if any prescription or setup changes are necessary. These steps are\\ntherefore medically necessary to ensure safe and effective administration of radiation. Ongoing therapeutic\\nradiology simulation-aided field setting verification is ordered throughout the course of therapy.\\nA high-frequency ultrasound image was acquired today for a two-dimensional evaluation of the tumor\\nvolume, depth, width, breadth, review of prior response to treatment, provide geometric accuracy of field\\nplacement, and determine whether to proceed with therapeutic delivery.\\nHigh frequency ultrasound depth is 2.44 mm, which is -0.19 mm in difference from previous imaging.\\nThe field placement and ultrasound imaging, per fraction, is separate and distinct from the initial simulation\\nand is an important task in providing safe administration of superficial radiation therapy. Physician evaluation\\nof the ultrasound information will be ongoing throughout the course of treatment and is deemed medically\\nnecessary to ensure the efficacy of treatment, whether to proceed with therapeutic delivery, and determine\\sherrie necessary changes. Today's images were evaluated for determination of continuation of treatment with\\nthe current plan or with necessary changes as appropriate. According to the review of verification\\ntherapeutic radiology simulation-aided field setting and imaging, no change is required.\\nAdditionally, the use of ultrasound visualization and targeted assessment allows the patient to be able to see\\nher cancer progress, encouraging the patient to complete and maintain compliance through the full\\ncourse of prescribed radiotherapy. Per Dr. Vincent, continued ultrasound guidance and therapeutic radiology\\nsimulation-aided field setting verification per fraction is required for field placement, measurement of tumor\\ndepth, tissues evaluation, progress, acute effect monitoring, and determination for therapeutic treatment\\ndelivery is appropriate.\\n\\nPhysician Orders: Plan: On Treatment Visit (OTV) with Ultrasound\\nPlan: The patient is undergoing superficial radiation therapy for skin cancer and presents for weekly\\nevaluation and management. Per protocol and as documented on the flow sheet, the patient was questioned\\cristal to subjective redness, pruritus, pain, drainage, fatigue, or any other symptoms. Objectively, the radiation\\narea was evaluated with regards to erythema, atrophy, scale, crusting, erosion, ulceration, edema, purpura,\\ntenderness, warmth, drainage, and any other findings. The plan was extensively reviewed including dose and\\ndosing schedule. The simulation and clinical setup were also reviewed as were external and any internal\\nshields and based on this review the appropriateness and sufficiency of treatment was determined.\\nA high-frequency ultrasound image was acquired today for a two-dimensional evaluation of the tumor\\nvolume, depth, width, breadth, review of prior response to treatment, provide geometric accuracy of field\\nplacement, and determine whether to proceed with therapeutic delivery.\\nHigh frequency ultrasound depth is 2.44 mm, which is 0.19 mm in difference from previous imaging.\\nPer Dr. Vincent, continued ultrasound guidance and therapeutic radiology simulation-aided field setting\\nverification per fraction is required for field placement, measurement of tumor depth, tissues evaluation,\\nprogress, acute effect monitoring, and determination for therapeutic treatment delivery is appropriate

## 2023-03-09 ENCOUNTER — APPOINTMENT (OUTPATIENT)
Dept: URBAN - METROPOLITAN AREA CLINIC 268 | Age: 83
Setting detail: DERMATOLOGY
End: 2023-03-10

## 2023-03-09 PROBLEM — C44.311 BASAL CELL CARCINOMA OF SKIN OF NOSE: Status: ACTIVE | Noted: 2023-03-09

## 2023-03-09 PROCEDURE — OTHER TREATMENT REGIMEN: OTHER

## 2023-03-09 PROCEDURE — G6001 ECHO GUIDANCE RADIOTHERAPY: HCPCS | Mod: XU

## 2023-03-09 PROCEDURE — OTHER SUPERFICIAL RADIATION TREATMENT: OTHER

## 2023-03-09 PROCEDURE — 77401 RADIATION TX DELIVERY SUPFC: CPT

## 2023-03-09 PROCEDURE — 77280 THER RAD SIMULAJ FIELD SMPL: CPT

## 2023-03-09 NOTE — PROCEDURE: TREATMENT REGIMEN
Plan: Physician Orders: Radiotherapy Treatment Plan: Superficial Radiotherapy with Ultrasound\\nPlan: This patient has been treated today with image-guided superficial radiation therapy for non-melanoma\\nskin cancer. Written informed consent has been previously obtained from this patient for this treatment. This\\nconsent is documented in the patient's chart. The patient gave verbal consent to continue treatment today.\\nThe patient was treated with a specific radiation dose and setup as prescribed by the provider listed on this\\nvisit note. A Radiation Therapist performed administration of radiation under the supervision of a provider.\\nThe treatment parameters and cumulative dose are indicated above. Prior to administering the radiation, the\\npatient underwent a verification therapeutic radiology simulation-aided field setting defining relevant normal\\nand abnormal target anatomy and acquiring images with a separate and distinct diagnostic high-frequency\\nultrasound to delineate tissues and determine whether to proceed with delivery of therapeutic, in addition to\\nretrieve data necessary to develop an optimal radiation treatment process for the patient. The field\\nplacement simulation documents any change seen in overall tumor volume documented in the patient’s\\nrecord, allows the clinician to indicate any needed changes in the treatment plan and/or prescription,\\nprovides diagnostic evaluation as the basis for performing the therapeutic procedure, and clearly identifies\\nthe information needed to decide to proceed with the therapeutic procedure. This process includes\\nverification of the treatment port(s) and proper treatment positioning. All treatment ports were\\nphotographed within electronic medical records. The patient's lead blocking along with gross tumor volume\\nand margin was confirmed. Considering superficial radiotherapy is clinical in setup, this requires the physician\\nand radiation therapist to clarify the location interest being treated against initial images, ultrasound,\\npathology, and patient anatomy. Care was taken to ensure erwin treated were geometrically accurate and\\nproperly positioned using therapeutic radiology simulation-aided field setting verification per fraction. This\\nprocess is also utilized to determine if any prescription or setup changes are necessary. These steps are\\ntherefore medically necessary to ensure safe and effective administration of radiation. Ongoing therapeutic\\nradiology simulation-aided field setting verification is ordered throughout the course of therapy.\\nA high-frequency ultrasound image was acquired today for a two-dimensional evaluation of the tumor\\nvolume, depth, width, breadth, review of prior response to treatment, provide geometric accuracy of field\\nplacement, and determine whether to proceed with therapeutic delivery.\\nHigh frequency ultrasound depth is 2.72 mm, which is +0.28 mm in difference from previous imaging.\\nThe field placement and ultrasound imaging, per fraction, is separate and distinct from the initial simulation\\nand is an important task in providing safe administration of superficial radiation therapy. Physician evaluation\\nof the ultrasound information will be ongoing throughout the course of treatment and is deemed medically\\nnecessary to ensure the efficacy of treatment, whether to proceed with therapeutic delivery, and determine\\sherrie necessary changes. Today's images were evaluated for determination of continuation of treatment with\\nthe current plan or with necessary changes as appropriate. According to the review of verification\\ntherapeutic radiology simulation-aided field setting and imaging, no change is required.\\nAdditionally, the use of ultrasound visualization and targeted assessment allows the patient to be able to see\\nher cancer progress, encouraging the patient to complete and maintain compliance through the full\\ncourse of prescribed radiotherapy. Per Dr. Vincent, continued ultrasound guidance and therapeutic radiology\\nsimulation-aided field setting verification per fraction is required for field placement, measurement of tumor\\ndepth, tissues evaluation, progress, acute effect monitoring, and determination for therapeutic treatment\\ndelivery is appropriate.\\n
Detail Level: Zone

## 2023-03-09 NOTE — PROCEDURE: SUPERFICIAL RADIATION TREATMENT
Bill For Simulation: Yes - (Simple Simulation: 45385) Bill For Simulation?: Yes - (Simple Simulation: 87563)

## 2023-03-13 ENCOUNTER — APPOINTMENT (OUTPATIENT)
Dept: URBAN - METROPOLITAN AREA CLINIC 268 | Age: 83
Setting detail: DERMATOLOGY
End: 2023-04-20

## 2023-03-13 PROCEDURE — 77336 RADIATION PHYSICS CONSULT: CPT

## 2023-03-14 ENCOUNTER — APPOINTMENT (OUTPATIENT)
Dept: URBAN - METROPOLITAN AREA CLINIC 268 | Age: 83
Setting detail: DERMATOLOGY
End: 2023-03-14

## 2023-03-14 PROBLEM — C44.311 BASAL CELL CARCINOMA OF SKIN OF NOSE: Status: ACTIVE | Noted: 2023-03-14

## 2023-03-14 PROCEDURE — 77401 RADIATION TX DELIVERY SUPFC: CPT

## 2023-03-14 PROCEDURE — G6001 ECHO GUIDANCE RADIOTHERAPY: HCPCS | Mod: XU

## 2023-03-14 PROCEDURE — OTHER TREATMENT REGIMEN: OTHER

## 2023-03-14 PROCEDURE — OTHER SUPERFICIAL RADIATION TREATMENT: OTHER

## 2023-03-14 PROCEDURE — 77280 THER RAD SIMULAJ FIELD SMPL: CPT

## 2023-03-14 NOTE — PROCEDURE: SUPERFICIAL RADIATION TREATMENT
Bill For Simulation?: Yes - (Simple Simulation: 17318) Bill For Simulation: Yes - (Simple Simulation: 58002)

## 2023-03-14 NOTE — PROCEDURE: TREATMENT REGIMEN
Plan: Physician Orders: Radiotherapy Treatment Plan: Superficial Radiotherapy with Ultrasound\\nPlan: This patient has been treated today with image-guided superficial radiation therapy for non-melanoma\\nskin cancer. Written informed consent has been previously obtained from this patient for this treatment. This\\nconsent is documented in the patient's chart. The patient gave verbal consent to continue treatment today.\\nThe patient was treated with a specific radiation dose and setup as prescribed by the provider listed on this\\nvisit note. A Radiation Therapist performed administration of radiation under the supervision of a provider.\\nThe treatment parameters and cumulative dose are indicated above. Prior to administering the radiation, the\\npatient underwent a verification therapeutic radiology simulation-aided field setting defining relevant normal\\nand abnormal target anatomy and acquiring images with a separate and distinct diagnostic high-frequency\\nultrasound to delineate tissues and determine whether to proceed with delivery of therapeutic, in addition to\\nretrieve data necessary to develop an optimal radiation treatment process for the patient. The field\\nplacement simulation documents any change seen in overall tumor volume documented in the patient’s\\nrecord, allows the clinician to indicate any needed changes in the treatment plan and/or prescription,\\nprovides diagnostic evaluation as the basis for performing the therapeutic procedure, and clearly identifies\\nthe information needed to decide to proceed with the therapeutic procedure. This process includes\\nverification of the treatment port(s) and proper treatment positioning. All treatment ports were\\nphotographed within electronic medical records. The patient's lead blocking along with gross tumor volume\\nand margin was confirmed. Considering superficial radiotherapy is clinical in setup, this requires the physician\\nand radiation therapist to clarify the location interest being treated against initial images, ultrasound,\\npathology, and patient anatomy. Care was taken to ensure erwin treated were geometrically accurate and\\nproperly positioned using therapeutic radiology simulation-aided field setting verification per fraction. This\\nprocess is also utilized to determine if any prescription or setup changes are necessary. These steps are\\ntherefore medically necessary to ensure safe and effective administration of radiation. Ongoing therapeutic\\nradiology simulation-aided field setting verification is ordered throughout the course of therapy.\\nA high-frequency ultrasound image was acquired today for a two-dimensional evaluation of the tumor\\nvolume, depth, width, breadth, review of prior response to treatment, provide geometric accuracy of field\\nplacement, and determine whether to proceed with therapeutic delivery.\\nHigh frequency ultrasound depth is 2.27 mm, which is -0.45 mm in difference from previous imaging.\\nThe field placement and ultrasound imaging, per fraction, is separate and distinct from the initial simulation\\nand is an important task in providing safe administration of superficial radiation therapy. Physician evaluation\\nof the ultrasound information will be ongoing throughout the course of treatment and is deemed medically\\nnecessary to ensure the efficacy of treatment, whether to proceed with therapeutic delivery, and determine\\sherrie necessary changes. Today's images were evaluated for determination of continuation of treatment with\\nthe current plan or with necessary changes as appropriate. According to the review of verification\\ntherapeutic radiology simulation-aided field setting and imaging, no change is required.\\nAdditionally, the use of ultrasound visualization and targeted assessment allows the patient to be able to see\\nher cancer progress, encouraging the patient to complete and maintain compliance through the full\\ncourse of prescribed radiotherapy. Per Dr. Vincent, continued ultrasound guidance and therapeutic radiology\\nsimulation-aided field setting verification per fraction is required for field placement, measurement of tumor\\ndepth, tissues evaluation, progress, acute effect monitoring, and determination for therapeutic treatment\\ndelivery is appropriate.\\n
Detail Level: Zone

## 2023-03-15 ENCOUNTER — APPOINTMENT (OUTPATIENT)
Dept: URBAN - METROPOLITAN AREA CLINIC 268 | Age: 83
Setting detail: DERMATOLOGY
End: 2023-03-16

## 2023-03-15 PROBLEM — C44.311 BASAL CELL CARCINOMA OF SKIN OF NOSE: Status: ACTIVE | Noted: 2023-03-15

## 2023-03-15 PROCEDURE — 77401 RADIATION TX DELIVERY SUPFC: CPT

## 2023-03-15 PROCEDURE — OTHER SUPERFICIAL RADIATION TREATMENT: OTHER

## 2023-03-15 PROCEDURE — 77280 THER RAD SIMULAJ FIELD SMPL: CPT

## 2023-03-15 PROCEDURE — G6001 ECHO GUIDANCE RADIOTHERAPY: HCPCS | Mod: XU

## 2023-03-15 PROCEDURE — OTHER TREATMENT REGIMEN: OTHER

## 2023-03-15 NOTE — PROCEDURE: SUPERFICIAL RADIATION TREATMENT
Bill For Simulation?: Yes - (Simple Simulation: 80120) Bill For Simulation: Yes - (Simple Simulation: [id number])

## 2023-03-15 NOTE — PROCEDURE: TREATMENT REGIMEN
Detail Level: Zone
Plan: Physician Orders: Radiotherapy Treatment Plan: Superficial Radiotherapy with Ultrasound\\nPlan: This patient has been treated today with image-guided superficial radiation therapy for non-melanoma\\nskin cancer. Written informed consent has been previously obtained from this patient for this treatment. This\\nconsent is documented in the patient's chart. The patient gave verbal consent to continue treatment today.\\nThe patient was treated with a specific radiation dose and setup as prescribed by the provider listed on this\\nvisit note. A Radiation Therapist performed administration of radiation under the supervision of a provider.\\nThe treatment parameters and cumulative dose are indicated above. Prior to administering the radiation, the\\npatient underwent a verification therapeutic radiology simulation-aided field setting defining relevant normal\\nand abnormal target anatomy and acquiring images with a separate and distinct diagnostic high-frequency\\nultrasound to delineate tissues and determine whether to proceed with delivery of therapeutic, in addition to\\nretrieve data necessary to develop an optimal radiation treatment process for the patient. The field\\nplacement simulation documents any change seen in overall tumor volume documented in the patient’s\\nrecord, allows the clinician to indicate any needed changes in the treatment plan and/or prescription,\\nprovides diagnostic evaluation as the basis for performing the therapeutic procedure, and clearly identifies\\nthe information needed to decide to proceed with the therapeutic procedure. This process includes\\nverification of the treatment port(s) and proper treatment positioning. All treatment ports were\\nphotographed within electronic medical records. The patient's lead blocking along with gross tumor volume\\nand margin was confirmed. Considering superficial radiotherapy is clinical in setup, this requires the physician\\nand radiation therapist to clarify the location interest being treated against initial images, ultrasound,\\npathology, and patient anatomy. Care was taken to ensure erwin treated were geometrically accurate and\\nproperly positioned using therapeutic radiology simulation-aided field setting verification per fraction. This\\nprocess is also utilized to determine if any prescription or setup changes are necessary. These steps are\\ntherefore medically necessary to ensure safe and effective administration of radiation. Ongoing therapeutic\\nradiology simulation-aided field setting verification is ordered throughout the course of therapy.\\nA high-frequency ultrasound image was acquired today for a two-dimensional evaluation of the tumor\\nvolume, depth, width, breadth, review of prior response to treatment, provide geometric accuracy of field\\nplacement, and determine whether to proceed with therapeutic delivery.\\nHigh frequency ultrasound depth is 2.62 mm, which is +0.35 mm in difference from previous imaging.\\nThe field placement and ultrasound imaging, per fraction, is separate and distinct from the initial simulation\\nand is an important task in providing safe administration of superficial radiation therapy. Physician evaluation\\nof the ultrasound information will be ongoing throughout the course of treatment and is deemed medically\\nnecessary to ensure the efficacy of treatment, whether to proceed with therapeutic delivery, and determine\\sherrie necessary changes. Today's images were evaluated for determination of continuation of treatment with\\nthe current plan or with necessary changes as appropriate. According to the review of verification\\ntherapeutic radiology simulation-aided field setting and imaging, no change is required.\\nAdditionally, the use of ultrasound visualization and targeted assessment allows the patient to be able to see\\nher cancer progress, encouraging the patient to complete and maintain compliance through the full\\ncourse of prescribed radiotherapy. Per Dr. Vincent, continued ultrasound guidance and therapeutic radiology\\nsimulation-aided field setting verification per fraction is required for field placement, measurement of tumor\\ndepth, tissues evaluation, progress, acute effect monitoring, and determination for therapeutic treatment\\ndelivery is appropriate.\\n

## 2023-03-16 ENCOUNTER — APPOINTMENT (OUTPATIENT)
Dept: URBAN - METROPOLITAN AREA CLINIC 268 | Age: 83
Setting detail: DERMATOLOGY
End: 2023-03-17

## 2023-03-16 PROBLEM — C44.311 BASAL CELL CARCINOMA OF SKIN OF NOSE: Status: ACTIVE | Noted: 2023-03-16

## 2023-03-16 PROCEDURE — G6001 ECHO GUIDANCE RADIOTHERAPY: HCPCS | Mod: XU

## 2023-03-16 PROCEDURE — OTHER TREATMENT REGIMEN: OTHER

## 2023-03-16 PROCEDURE — 77280 THER RAD SIMULAJ FIELD SMPL: CPT

## 2023-03-16 PROCEDURE — OTHER SUPERFICIAL RADIATION TREATMENT: OTHER

## 2023-03-16 PROCEDURE — 77401 RADIATION TX DELIVERY SUPFC: CPT

## 2023-03-16 NOTE — PROCEDURE: SUPERFICIAL RADIATION TREATMENT
Bill For Simulation?: Yes - (Simple Simulation: 67009) Bill For Simulation: Yes - (Simple Simulation: 29141)

## 2023-03-16 NOTE — PROCEDURE: TREATMENT REGIMEN
Detail Level: Zone
Plan: Physician Orders: Radiotherapy Treatment Plan: Superficial Radiotherapy with Ultrasound\\nPlan: This patient has been treated today with image-guided superficial radiation therapy for non-melanoma\\nskin cancer. Written informed consent has been previously obtained from this patient for this treatment. This\\nconsent is documented in the patient's chart. The patient gave verbal consent to continue treatment today.\\nThe patient was treated with a specific radiation dose and setup as prescribed by the provider listed on this\\nvisit note. A Radiation Therapist performed administration of radiation under the supervision of a provider.\\nThe treatment parameters and cumulative dose are indicated above. Prior to administering the radiation, the\\npatient underwent a verification therapeutic radiology simulation-aided field setting defining relevant normal\\nand abnormal target anatomy and acquiring images with a separate and distinct diagnostic high-frequency\\nultrasound to delineate tissues and determine whether to proceed with delivery of therapeutic, in addition to\\nretrieve data necessary to develop an optimal radiation treatment process for the patient. The field\\nplacement simulation documents any change seen in overall tumor volume documented in the patient’s\\nrecord, allows the clinician to indicate any needed changes in the treatment plan and/or prescription,\\nprovides diagnostic evaluation as the basis for performing the therapeutic procedure, and clearly identifies\\nthe information needed to decide to proceed with the therapeutic procedure. This process includes\\nverification of the treatment port(s) and proper treatment positioning. All treatment ports were\\nphotographed within electronic medical records. The patient's lead blocking along with gross tumor volume\\nand margin was confirmed. Considering superficial radiotherapy is clinical in setup, this requires the physician\\nand radiation therapist to clarify the location interest being treated against initial images, ultrasound,\\npathology, and patient anatomy. Care was taken to ensure erwin treated were geometrically accurate and\\nproperly positioned using therapeutic radiology simulation-aided field setting verification per fraction. This\\nprocess is also utilized to determine if any prescription or setup changes are necessary. These steps are\\ntherefore medically necessary to ensure safe and effective administration of radiation. Ongoing therapeutic\\nradiology simulation-aided field setting verification is ordered throughout the course of therapy.\\nA high-frequency ultrasound image was acquired today for a two-dimensional evaluation of the tumor\\nvolume, depth, width, breadth, review of prior response to treatment, provide geometric accuracy of field\\nplacement, and determine whether to proceed with therapeutic delivery.\\nHigh frequency ultrasound depth is 2.68 mm, which is +0.06 mm in difference from previous imaging.\\nThe field placement and ultrasound imaging, per fraction, is separate and distinct from the initial simulation\\nand is an important task in providing safe administration of superficial radiation therapy. Physician evaluation\\nof the ultrasound information will be ongoing throughout the course of treatment and is deemed medically\\nnecessary to ensure the efficacy of treatment, whether to proceed with therapeutic delivery, and determine\\sherrie necessary changes. Today's images were evaluated for determination of continuation of treatment with\\nthe current plan or with necessary changes as appropriate. According to the review of verification\\ntherapeutic radiology simulation-aided field setting and imaging, no change is required.\\nAdditionally, the use of ultrasound visualization and targeted assessment allows the patient to be able to see\\nher cancer progress, encouraging the patient to complete and maintain compliance through the full\\ncourse of prescribed radiotherapy. Per Dr. Vincent, continued ultrasound guidance and therapeutic radiology\\nsimulation-aided field setting verification per fraction is required for field placement, measurement of tumor\\ndepth, tissues evaluation, progress, acute effect monitoring, and determination for therapeutic treatment\\ndelivery is appropriate.\\n

## 2023-03-21 ENCOUNTER — APPOINTMENT (OUTPATIENT)
Dept: URBAN - METROPOLITAN AREA CLINIC 268 | Age: 83
Setting detail: DERMATOLOGY
End: 2023-03-22

## 2023-03-21 PROBLEM — C44.311 BASAL CELL CARCINOMA OF SKIN OF NOSE: Status: ACTIVE | Noted: 2023-03-21

## 2023-03-21 PROCEDURE — 77280 THER RAD SIMULAJ FIELD SMPL: CPT

## 2023-03-21 PROCEDURE — 77401 RADIATION TX DELIVERY SUPFC: CPT

## 2023-03-21 PROCEDURE — OTHER SUPERFICIAL RADIATION TREATMENT: OTHER

## 2023-03-21 PROCEDURE — G6001 ECHO GUIDANCE RADIOTHERAPY: HCPCS | Mod: XU

## 2023-03-21 PROCEDURE — OTHER TREATMENT REGIMEN: OTHER

## 2023-03-21 PROCEDURE — 77427 RADIATION TX MANAGEMENT X5: CPT

## 2023-03-21 NOTE — PROCEDURE: TREATMENT REGIMEN
Plan: Physician Orders: Radiotherapy Treatment Plan: Superficial Radiotherapy with Ultrasound\\nPlan: This patient has been treated today with image-guided superficial radiation therapy for non-melanoma\\nskin cancer. Written informed consent has been previously obtained from this patient for this treatment. This\\nconsent is documented in the patient's chart. The patient gave verbal consent to continue treatment today.\\nThe patient was treated with a specific radiation dose and setup as prescribed by the provider listed on this\\nvisit note. A Radiation Therapist performed administration of radiation under the supervision of a provider.\\nThe treatment parameters and cumulative dose are indicated above. Prior to administering the radiation, the\\npatient underwent a verification therapeutic radiology simulation-aided field setting defining relevant normal\\nand abnormal target anatomy and acquiring images with a separate and distinct diagnostic high-frequency\\nultrasound to delineate tissues and determine whether to proceed with delivery of therapeutic, in addition to\\nretrieve data necessary to develop an optimal radiation treatment process for the patient. The field\\nplacement simulation documents any change seen in overall tumor volume documented in the patient’s\\nrecord, allows the clinician to indicate any needed changes in the treatment plan and/or prescription,\\nprovides diagnostic evaluation as the basis for performing the therapeutic procedure, and clearly identifies\\nthe information needed to decide to proceed with the therapeutic procedure. This process includes\\nverification of the treatment port(s) and proper treatment positioning. All treatment ports were\\nphotographed within electronic medical records. The patient's lead blocking along with gross tumor volume\\nand margin was confirmed. Considering superficial radiotherapy is clinical in setup, this requires the physician\\nand radiation therapist to clarify the location interest being treated against initial images, ultrasound,\\npathology, and patient anatomy. Care was taken to ensure erwin treated were geometrically accurate and\\nproperly positioned using therapeutic radiology simulation-aided field setting verification per fraction. This\\nprocess is also utilized to determine if any prescription or setup changes are necessary. These steps are\\ntherefore medically necessary to ensure safe and effective administration of radiation. Ongoing therapeutic\\nradiology simulation-aided field setting verification is ordered throughout the course of therapy.\\nA high-frequency ultrasound image was acquired today for a two-dimensional evaluation of the tumor\\nvolume, depth, width, breadth, review of prior response to treatment, provide geometric accuracy of field\\nplacement, and determine whether to proceed with therapeutic delivery.\\nHigh frequency ultrasound depth is 2.29 mm, which is -0.37 mm in difference from previous imaging.\\nThe field placement and ultrasound imaging, per fraction, is separate and distinct from the initial simulation\\nand is an important task in providing safe administration of superficial radiation therapy. Physician evaluation\\nof the ultrasound information will be ongoing throughout the course of treatment and is deemed medically\\nnecessary to ensure the efficacy of treatment, whether to proceed with therapeutic delivery, and determine\\sherrie necessary changes. Today's images were evaluated for determination of continuation of treatment with\\nthe current plan or with necessary changes as appropriate. According to the review of verification\\ntherapeutic radiology simulation-aided field setting and imaging, no change is required.\\nAdditionally, the use of ultrasound visualization and targeted assessment allows the patient to be able to see\\nher cancer progress, encouraging the patient to complete and maintain compliance through the full\\ncourse of prescribed radiotherapy. Per Dr. Vincent, continued ultrasound guidance and therapeutic radiology\\nsimulation-aided field setting verification per fraction is required for field placement, measurement of tumor\\ndepth, tissues evaluation, progress, acute effect monitoring, and determination for therapeutic treatment\\ndelivery is appropriate.\\n\\n\\nPhysician Orders: Plan: On Treatment Visit (OTV) with Ultrasound\\nPlan: The patient is undergoing superficial radiation therapy for skin cancer and presents for weekly\\nevaluation and management. Per protocol and as documented on the flow sheet, the patient was questioned\\cristal to subjective redness, pruritus, pain, drainage, fatigue, or any other symptoms. Objectively, the radiation\\narea was evaluated with regards to erythema, atrophy, scale, crusting, erosion, ulceration, edema, purpura,\\ntenderness, warmth, drainage, and any other findings. The plan was extensively reviewed including dose and\\ndosing schedule. The simulation and clinical setup were also reviewed as were external and any internal\\nshields and based on this review the appropriateness and sufficiency of treatment was determined.\\nA high-frequency ultrasound image was acquired today for a two-dimensional evaluation of the tumor\\nvolume, depth, width, breadth, review of prior response to treatment, provide geometric accuracy of field\\nplacement, and determine whether to proceed with therapeutic delivery.\\nHigh frequency ultrasound depth is 2.29 mm, which is 0.37 mm in difference from previous imaging.\\nPer Dr. Vincent, continued ultrasound guidance and therapeutic radiology simulation-aided field setting\\nverification per fraction is required for field placement, measurement of tumor depth, tissues evaluation,\\nprogress, acute effect monitoring, and determination for therapeutic treatment delivery is appropriate.
Detail Level: Zone

## 2023-03-21 NOTE — PROCEDURE: SUPERFICIAL RADIATION TREATMENT
Bill For Simulation?: Yes - (Simple Simulation: 29291) Bill For Simulation: Yes - (Simple Simulation: 41652)

## 2023-03-22 ENCOUNTER — APPOINTMENT (OUTPATIENT)
Dept: URBAN - METROPOLITAN AREA CLINIC 268 | Age: 83
Setting detail: DERMATOLOGY
End: 2023-03-31

## 2023-03-22 PROBLEM — C44.311 BASAL CELL CARCINOMA OF SKIN OF NOSE: Status: ACTIVE | Noted: 2023-03-22

## 2023-03-22 PROCEDURE — 77280 THER RAD SIMULAJ FIELD SMPL: CPT

## 2023-03-22 PROCEDURE — 77401 RADIATION TX DELIVERY SUPFC: CPT

## 2023-03-22 PROCEDURE — OTHER SUPERFICIAL RADIATION TREATMENT: OTHER

## 2023-03-22 PROCEDURE — G6001 ECHO GUIDANCE RADIOTHERAPY: HCPCS | Mod: XU

## 2023-03-22 PROCEDURE — OTHER TREATMENT REGIMEN: OTHER

## 2023-03-22 NOTE — PROCEDURE: TREATMENT REGIMEN
Detail Level: Zone
Plan: Physician Orders: Radiotherapy Treatment Plan: Superficial Radiotherapy with Ultrasound\\nPlan: This patient has been treated today with image-guided superficial radiation therapy for non-melanoma\\nskin cancer. Written informed consent has been previously obtained from this patient for this treatment. This\\nconsent is documented in the patient's chart. The patient gave verbal consent to continue treatment today.\\nThe patient was treated with a specific radiation dose and setup as prescribed by the provider listed on this\\nvisit note. A Radiation Therapist performed administration of radiation under the supervision of a provider.\\nThe treatment parameters and cumulative dose are indicated above. Prior to administering the radiation, the\\npatient underwent a verification therapeutic radiology simulation-aided field setting defining relevant normal\\nand abnormal target anatomy and acquiring images with a separate and distinct diagnostic high-frequency\\nultrasound to delineate tissues and determine whether to proceed with delivery of therapeutic, in addition to\\nretrieve data necessary to develop an optimal radiation treatment process for the patient. The field\\nplacement simulation documents any change seen in overall tumor volume documented in the patient’s\\nrecord, allows the clinician to indicate any needed changes in the treatment plan and/or prescription,\\nprovides diagnostic evaluation as the basis for performing the therapeutic procedure, and clearly identifies\\nthe information needed to decide to proceed with the therapeutic procedure. This process includes\\nverification of the treatment port(s) and proper treatment positioning. All treatment ports were\\nphotographed within electronic medical records. The patient's lead blocking along with gross tumor volume\\nand margin was confirmed. Considering superficial radiotherapy is clinical in setup, this requires the physician\\nand radiation therapist to clarify the location interest being treated against initial images, ultrasound,\\npathology, and patient anatomy. Care was taken to ensure erwin treated were geometrically accurate and\\nproperly positioned using therapeutic radiology simulation-aided field setting verification per fraction. This\\nprocess is also utilized to determine if any prescription or setup changes are necessary. These steps are\\ntherefore medically necessary to ensure safe and effective administration of radiation. Ongoing therapeutic\\nradiology simulation-aided field setting verification is ordered throughout the course of therapy.\\nA high-frequency ultrasound image was acquired today for a two-dimensional evaluation of the tumor\\nvolume, depth, width, breadth, review of prior response to treatment, provide geometric accuracy of field\\nplacement, and determine whether to proceed with therapeutic delivery.\\nHigh frequency ultrasound depth is 2.46 mm, which is -0.17 mm in difference from previous imaging.\\nThe field placement and ultrasound imaging, per fraction, is separate and distinct from the initial simulation\\nand is an important task in providing safe administration of superficial radiation therapy. Physician evaluation\\nof the ultrasound information will be ongoing throughout the course of treatment and is deemed medically\\nnecessary to ensure the efficacy of treatment, whether to proceed with therapeutic delivery, and determine\\sherrie necessary changes. Today's images were evaluated for determination of continuation of treatment with\\nthe current plan or with necessary changes as appropriate. According to the review of verification\\ntherapeutic radiology simulation-aided field setting and imaging, no change is required.\\nAdditionally, the use of ultrasound visualization and targeted assessment allows the patient to be able to see\\nher cancer progress, encouraging the patient to complete and maintain compliance through the full\\ncourse of prescribed radiotherapy. Per Dr. Vincent, continued ultrasound guidance and therapeutic radiology\\nsimulation-aided field setting verification per fraction is required for field placement, measurement of tumor\\ndepth, tissues evaluation, progress, acute effect monitoring, and determination for therapeutic treatment\\ndelivery is appropriate.

## 2023-03-22 NOTE — PROCEDURE: SUPERFICIAL RADIATION TREATMENT
Bill For Simulation?: Yes - (Simple Simulation: 79769) Bill For Simulation: Yes - (Simple Simulation: 43655)

## 2023-03-23 ENCOUNTER — APPOINTMENT (OUTPATIENT)
Dept: URBAN - METROPOLITAN AREA CLINIC 268 | Age: 83
Setting detail: DERMATOLOGY
End: 2023-03-31

## 2023-03-23 PROBLEM — C44.311 BASAL CELL CARCINOMA OF SKIN OF NOSE: Status: ACTIVE | Noted: 2023-03-23

## 2023-03-23 PROCEDURE — OTHER SUPERFICIAL RADIATION TREATMENT: OTHER

## 2023-03-23 PROCEDURE — G6001 ECHO GUIDANCE RADIOTHERAPY: HCPCS | Mod: XU

## 2023-03-23 PROCEDURE — OTHER TREATMENT REGIMEN: OTHER

## 2023-03-23 PROCEDURE — 77401 RADIATION TX DELIVERY SUPFC: CPT

## 2023-03-23 PROCEDURE — 77280 THER RAD SIMULAJ FIELD SMPL: CPT

## 2023-03-23 NOTE — PROCEDURE: TREATMENT REGIMEN
Detail Level: Zone
Plan: Physician Orders: Radiotherapy Treatment Plan: Superficial Radiotherapy with Ultrasound\\nPlan: This patient has been treated today with image-guided superficial radiation therapy for non-melanoma\\nskin cancer. Written informed consent has been previously obtained from this patient for this treatment. This\\nconsent is documented in the patient's chart. The patient gave verbal consent to continue treatment today.\\nThe patient was treated with a specific radiation dose and setup as prescribed by the provider listed on this\\nvisit note. A Radiation Therapist performed administration of radiation under the supervision of a provider.\\nThe treatment parameters and cumulative dose are indicated above. Prior to administering the radiation, the\\npatient underwent a verification therapeutic radiology simulation-aided field setting defining relevant normal\\nand abnormal target anatomy and acquiring images with a separate and distinct diagnostic high-frequency\\nultrasound to delineate tissues and determine whether to proceed with delivery of therapeutic, in addition to\\nretrieve data necessary to develop an optimal radiation treatment process for the patient. The field\\nplacement simulation documents any change seen in overall tumor volume documented in the patient’s\\nrecord, allows the clinician to indicate any needed changes in the treatment plan and/or prescription,\\nprovides diagnostic evaluation as the basis for performing the therapeutic procedure, and clearly identifies\\nthe information needed to decide to proceed with the therapeutic procedure. This process includes\\nverification of the treatment port(s) and proper treatment positioning. All treatment ports were\\nphotographed within electronic medical records. The patient's lead blocking along with gross tumor volume\\nand margin was confirmed. Considering superficial radiotherapy is clinical in setup, this requires the physician\\nand radiation therapist to clarify the location interest being treated against initial images, ultrasound,\\npathology, and patient anatomy. Care was taken to ensure erwin treated were geometrically accurate and\\nproperly positioned using therapeutic radiology simulation-aided field setting verification per fraction. This\\nprocess is also utilized to determine if any prescription or setup changes are necessary. These steps are\\ntherefore medically necessary to ensure safe and effective administration of radiation. Ongoing therapeutic\\nradiology simulation-aided field setting verification is ordered throughout the course of therapy.\\nA high-frequency ultrasound image was acquired today for a two-dimensional evaluation of the tumor\\nvolume, depth, width, breadth, review of prior response to treatment, provide geometric accuracy of field\\nplacement, and determine whether to proceed with therapeutic delivery.\\nHigh frequency ultrasound depth is 2.53 mm, which is +0.07 mm in difference from previous imaging.\\nThe field placement and ultrasound imaging, per fraction, is separate and distinct from the initial simulation\\nand is an important task in providing safe administration of superficial radiation therapy. Physician evaluation\\nof the ultrasound information will be ongoing throughout the course of treatment and is deemed medically\\nnecessary to ensure the efficacy of treatment, whether to proceed with therapeutic delivery, and determine\\sherrie necessary changes. Today's images were evaluated for determination of continuation of treatment with\\nthe current plan or with necessary changes as appropriate. According to the review of verification\\ntherapeutic radiology simulation-aided field setting and imaging, no change is required.\\nAdditionally, the use of ultrasound visualization and targeted assessment allows the patient to be able to see\\nher cancer progress, encouraging the patient to complete and maintain compliance through the full\\ncourse of prescribed radiotherapy. Per Dr. Vincent, continued ultrasound guidance and therapeutic radiology\\nsimulation-aided field setting verification per fraction is required for field placement, measurement of tumor\\ndepth, tissues evaluation, progress, acute effect monitoring, and determination for therapeutic treatment\\ndelivery is appropriate.

## 2023-03-23 NOTE — PROCEDURE: SUPERFICIAL RADIATION TREATMENT
Bill For Simulation: Yes - (Simple Simulation: 39110) Bill For Simulation?: Yes - (Simple Simulation: 89675)

## 2023-03-26 ENCOUNTER — APPOINTMENT (OUTPATIENT)
Dept: URBAN - METROPOLITAN AREA CLINIC 268 | Age: 83
Setting detail: DERMATOLOGY
End: 2023-05-16

## 2023-03-26 PROCEDURE — 77336 RADIATION PHYSICS CONSULT: CPT

## 2023-03-28 ENCOUNTER — APPOINTMENT (OUTPATIENT)
Dept: URBAN - METROPOLITAN AREA CLINIC 268 | Age: 83
Setting detail: DERMATOLOGY
End: 2023-03-29

## 2023-03-28 PROBLEM — C44.311 BASAL CELL CARCINOMA OF SKIN OF NOSE: Status: ACTIVE | Noted: 2023-03-28

## 2023-03-28 PROCEDURE — 77280 THER RAD SIMULAJ FIELD SMPL: CPT

## 2023-03-28 PROCEDURE — OTHER SUPERFICIAL RADIATION TREATMENT: OTHER

## 2023-03-28 PROCEDURE — OTHER TREATMENT REGIMEN: OTHER

## 2023-03-28 PROCEDURE — G6001 ECHO GUIDANCE RADIOTHERAPY: HCPCS | Mod: XU

## 2023-03-28 PROCEDURE — 77401 RADIATION TX DELIVERY SUPFC: CPT

## 2023-03-28 NOTE — PROCEDURE: SUPERFICIAL RADIATION TREATMENT
Bill For Simulation: Yes - (Simple Simulation: [id number]) Bill For Simulation?: Yes - (Simple Simulation: 13439)

## 2023-03-28 NOTE — PROCEDURE: TREATMENT REGIMEN
Plan: Physician Orders: Radiotherapy Treatment Plan: Superficial Radiotherapy with Ultrasound\\nPlan: This patient has been treated today with image-guided superficial radiation therapy for non-melanoma\\nskin cancer. Written informed consent has been previously obtained from this patient for this treatment. This\\nconsent is documented in the patient's chart. The patient gave verbal consent to continue treatment today.\\nThe patient was treated with a specific radiation dose and setup as prescribed by the provider listed on this\\nvisit note. A Radiation Therapist performed administration of radiation under the supervision of a provider.\\nThe treatment parameters and cumulative dose are indicated above. Prior to administering the radiation, the\\npatient underwent a verification therapeutic radiology simulation-aided field setting defining relevant normal\\nand abnormal target anatomy and acquiring images with a separate and distinct diagnostic high-frequency\\nultrasound to delineate tissues and determine whether to proceed with delivery of therapeutic, in addition to\\nretrieve data necessary to develop an optimal radiation treatment process for the patient. The field\\nplacement simulation documents any change seen in overall tumor volume documented in the patient’s\\nrecord, allows the clinician to indicate any needed changes in the treatment plan and/or prescription,\\nprovides diagnostic evaluation as the basis for performing the therapeutic procedure, and clearly identifies\\nthe information needed to decide to proceed with the therapeutic procedure. This process includes\\nverification of the treatment port(s) and proper treatment positioning. All treatment ports were\\nphotographed within electronic medical records. The patient's lead blocking along with gross tumor volume\\nand margin was confirmed. Considering superficial radiotherapy is clinical in setup, this requires the physician\\nand radiation therapist to clarify the location interest being treated against initial images, ultrasound,\\npathology, and patient anatomy. Care was taken to ensure erwin treated were geometrically accurate and\\nproperly positioned using therapeutic radiology simulation-aided field setting verification per fraction. This\\nprocess is also utilized to determine if any prescription or setup changes are necessary. These steps are\\ntherefore medically necessary to ensure safe and effective administration of radiation. Ongoing therapeutic\\nradiology simulation-aided field setting verification is ordered throughout the course of therapy.\\nA high-frequency ultrasound image was acquired today for a two-dimensional evaluation of the tumor\\nvolume, depth, width, breadth, review of prior response to treatment, provide geometric accuracy of field\\nplacement, and determine whether to proceed with therapeutic delivery.\\nHigh frequency ultrasound depth is 2.16 mm, which is -0.37 mm in difference from previous imaging.\\nThe field placement and ultrasound imaging, per fraction, is separate and distinct from the initial simulation\\nand is an important task in providing safe administration of superficial radiation therapy. Physician evaluation\\nof the ultrasound information will be ongoing throughout the course of treatment and is deemed medically\\nnecessary to ensure the efficacy of treatment, whether to proceed with therapeutic delivery, and determine\\sherrie necessary changes. Today's images were evaluated for determination of continuation of treatment with\\nthe current plan or with necessary changes as appropriate. According to the review of verification\\ntherapeutic radiology simulation-aided field setting and imaging, no change is required.\\nAdditionally, the use of ultrasound visualization and targeted assessment allows the patient to be able to see\\nher cancer progress, encouraging the patient to complete and maintain compliance through the full\\ncourse of prescribed radiotherapy. Per Dr. Vincent, continued ultrasound guidance and therapeutic radiology\\nsimulation-aided field setting verification per fraction is required for field placement, measurement of tumor\\ndepth, tissues evaluation, progress, acute effect monitoring, and determination for therapeutic treatment\\ndelivery is appropriate.
Detail Level: Zone

## 2023-03-29 ENCOUNTER — APPOINTMENT (OUTPATIENT)
Dept: URBAN - METROPOLITAN AREA CLINIC 268 | Age: 83
Setting detail: DERMATOLOGY
End: 2023-03-29

## 2023-03-29 PROBLEM — C44.311 BASAL CELL CARCINOMA OF SKIN OF NOSE: Status: ACTIVE | Noted: 2023-03-29

## 2023-03-29 PROCEDURE — OTHER SUPERFICIAL RADIATION TREATMENT: OTHER

## 2023-03-29 PROCEDURE — G6001 ECHO GUIDANCE RADIOTHERAPY: HCPCS | Mod: XU

## 2023-03-29 PROCEDURE — 77280 THER RAD SIMULAJ FIELD SMPL: CPT

## 2023-03-29 PROCEDURE — 77401 RADIATION TX DELIVERY SUPFC: CPT

## 2023-03-29 PROCEDURE — OTHER TREATMENT REGIMEN: OTHER

## 2023-03-29 NOTE — PROCEDURE: SUPERFICIAL RADIATION TREATMENT
Bill For Simulation: Yes - (Simple Simulation: 69766) Bill For Simulation?: Yes - (Simple Simulation: 69033)

## 2023-03-30 ENCOUNTER — APPOINTMENT (OUTPATIENT)
Dept: URBAN - METROPOLITAN AREA CLINIC 268 | Age: 83
Setting detail: DERMATOLOGY
End: 2023-03-31

## 2023-03-30 PROBLEM — C44.311 BASAL CELL CARCINOMA OF SKIN OF NOSE: Status: ACTIVE | Noted: 2023-03-30

## 2023-03-30 PROCEDURE — OTHER SUPERFICIAL RADIATION TREATMENT: OTHER

## 2023-03-30 PROCEDURE — OTHER TREATMENT REGIMEN: OTHER

## 2023-03-30 PROCEDURE — 77401 RADIATION TX DELIVERY SUPFC: CPT

## 2023-03-30 PROCEDURE — 77280 THER RAD SIMULAJ FIELD SMPL: CPT

## 2023-03-30 PROCEDURE — 77427 RADIATION TX MANAGEMENT X5: CPT

## 2023-03-30 PROCEDURE — G6001 ECHO GUIDANCE RADIOTHERAPY: HCPCS | Mod: XU

## 2023-03-30 NOTE — PROCEDURE: TREATMENT REGIMEN
Detail Level: Zone
Plan: Physician Orders: Radiotherapy Treatment Plan: Superficial Radiotherapy with Ultrasound\\nPlan: This patient has been treated today with image-guided superficial radiation therapy for non-melanoma\\nskin cancer. Written informed consent has been previously obtained from this patient for this treatment. This\\nconsent is documented in the patient's chart. The patient gave verbal consent to continue treatment today.\\nThe patient was treated with a specific radiation dose and setup as prescribed by the provider listed on this\\nvisit note. A Radiation Therapist performed administration of radiation under the supervision of a provider.\\nThe treatment parameters and cumulative dose are indicated above. Prior to administering the radiation, the\\npatient underwent a verification therapeutic radiology simulation-aided field setting defining relevant normal\\nand abnormal target anatomy and acquiring images with a separate and distinct diagnostic high-frequency\\nultrasound to delineate tissues and determine whether to proceed with delivery of therapeutic, in addition to\\nretrieve data necessary to develop an optimal radiation treatment process for the patient. The field\\nplacement simulation documents any change seen in overall tumor volume documented in the patient’s\\nrecord, allows the clinician to indicate any needed changes in the treatment plan and/or prescription,\\nprovides diagnostic evaluation as the basis for performing the therapeutic procedure, and clearly identifies\\nthe information needed to decide to proceed with the therapeutic procedure. This process includes\\nverification of the treatment port(s) and proper treatment positioning. All treatment ports were\\nphotographed within electronic medical records. The patient's lead blocking along with gross tumor volume\\nand margin was confirmed. Considering superficial radiotherapy is clinical in setup, this requires the physician\\nand radiation therapist to clarify the location interest being treated against initial images, ultrasound,\\npathology, and patient anatomy. Care was taken to ensure erwin treated were geometrically accurate and\\nproperly positioned using therapeutic radiology simulation-aided field setting verification per fraction. This\\nprocess is also utilized to determine if any prescription or setup changes are necessary. These steps are\\ntherefore medically necessary to ensure safe and effective administration of radiation. Ongoing therapeutic\\nradiology simulation-aided field setting verification is ordered throughout the course of therapy.\\nA high-frequency ultrasound image was acquired today for a two-dimensional evaluation of the tumor\\nvolume, depth, width, breadth, review of prior response to treatment, provide geometric accuracy of field\\nplacement, and determine whether to proceed with therapeutic delivery.\\nHigh frequency ultrasound depth is 1.85 mm, which is -0.51 mm in difference from previous imaging.\\nThe field placement and ultrasound imaging, per fraction, is separate and distinct from the initial simulation\\nand is an important task in providing safe administration of superficial radiation therapy. Physician evaluation\\nof the ultrasound information will be ongoing throughout the course of treatment and is deemed medically\\nnecessary to ensure the efficacy of treatment, whether to proceed with therapeutic delivery, and determine\\sherrie necessary changes. Today's images were evaluated for determination of continuation of treatment with\\nthe current plan or with necessary changes as appropriate. According to the review of verification\\ntherapeutic radiology simulation-aided field setting and imaging, no change is required.\\nAdditionally, the use of ultrasound visualization and targeted assessment allows the patient to be able to see\\nher cancer progress, encouraging the patient to complete and maintain compliance through the full\\ncourse of prescribed radiotherapy. Per Dr. Vincent, continued ultrasound guidance and therapeutic radiology\\nsimulation-aided field setting verification per fraction is required for field placement, measurement of tumor\\ndepth, tissues evaluation, progress, acute effect monitoring, and determination for therapeutic treatment\\ndelivery is appropriate.\\n\\nPhysician Orders: Plan: On Treatment Visit (OTV) with Ultrasound\\nPlan: The patient is undergoing superficial radiation therapy for skin cancer and presents for weekly\\nevaluation and management. Per protocol and as documented on the flow sheet, the patient was questioned\\cristal to subjective redness, pruritus, pain, drainage, fatigue, or any other symptoms. Objectively, the radiation\\narea was evaluated with regards to erythema, atrophy, scale, crusting, erosion, ulceration, edema, purpura,\\ntenderness, warmth, drainage, and any other findings. The plan was extensively reviewed including dose and\\ndosing schedule. The simulation and clinical setup were also reviewed as were external and any internal\\nshields and based on this review the appropriateness and sufficiency of treatment was determined.\\nA high-frequency ultrasound image was acquired today for a two-dimensional evaluation of the tumor\\nvolume, depth, width, breadth, review of prior response to treatment, provide geometric accuracy of field\\nplacement, and determine whether to proceed with therapeutic delivery.\\nHigh frequency ultrasound depth is 1.85 mm, which is 0.51 mm in difference from previous imaging.\\nPer Dr. Vincent, continued ultrasound guidance and therapeutic radiology simulation-aided field setting\\nverification per fraction is required for field placement, measurement of tumor depth, tissues evaluation,\\nprogress, acute effect monitoring, and determination for therapeutic treatment delivery is appropriate.

## 2023-03-30 NOTE — PROCEDURE: SUPERFICIAL RADIATION TREATMENT
Bill For Simulation: Yes - (Simple Simulation: 66504) Bill For Simulation?: Yes - (Simple Simulation: 96276)

## 2023-04-26 NOTE — PROCEDURE: SUPERFICIAL RADIATION TREATMENT
Detail Level: Zone Patient Specific Counseling (Will Not Stick From Patient To Patient): 3cm Total Dose (Optional-Please Include Units): 5,456.0 cGy Patient Specific Counseling (Will Not Stick From Patient To Patient): Refer to Dr. Erin Dyer Prescription Strength Graduated Compression Stockings Recommendations: The patient was counseled that prescription strength graduated compression stockings should be worn for all waking hours. They will follow up with a venous specialist to monitor graduated compression stocking usage and their symptoms.

## 2023-04-27 ENCOUNTER — APPOINTMENT (OUTPATIENT)
Dept: URBAN - METROPOLITAN AREA CLINIC 268 | Age: 83
Setting detail: DERMATOLOGY
End: 2023-04-28

## 2023-04-27 PROBLEM — C44.91 BASAL CELL CARCINOMA OF SKIN, UNSPECIFIED: Status: ACTIVE | Noted: 2023-04-27

## 2023-04-27 PROCEDURE — G6001 ECHO GUIDANCE RADIOTHERAPY: HCPCS

## 2023-04-27 PROCEDURE — 99213 OFFICE O/P EST LOW 20 MIN: CPT

## 2023-10-26 ENCOUNTER — APPOINTMENT (OUTPATIENT)
Dept: URBAN - METROPOLITAN AREA CLINIC 302 | Age: 83
Setting detail: DERMATOLOGY
End: 2023-10-26

## 2023-10-26 VITALS — HEIGHT: 65 IN | WEIGHT: 139 LBS

## 2023-10-26 DIAGNOSIS — L81.4 OTHER MELANIN HYPERPIGMENTATION: ICD-10-CM

## 2023-10-26 DIAGNOSIS — D49.2 NEOPLASM OF UNSPECIFIED BEHAVIOR OF BONE, SOFT TISSUE, AND SKIN: ICD-10-CM

## 2023-10-26 DIAGNOSIS — D18.0 HEMANGIOMA: ICD-10-CM

## 2023-10-26 DIAGNOSIS — Z85.828 PERSONAL HISTORY OF OTHER MALIGNANT NEOPLASM OF SKIN: ICD-10-CM

## 2023-10-26 DIAGNOSIS — L57.0 ACTINIC KERATOSIS: ICD-10-CM

## 2023-10-26 DIAGNOSIS — D22 MELANOCYTIC NEVI: ICD-10-CM

## 2023-10-26 DIAGNOSIS — L82.1 OTHER SEBORRHEIC KERATOSIS: ICD-10-CM

## 2023-10-26 PROBLEM — D18.01 HEMANGIOMA OF SKIN AND SUBCUTANEOUS TISSUE: Status: ACTIVE | Noted: 2023-10-26

## 2023-10-26 PROBLEM — D22.39 MELANOCYTIC NEVI OF OTHER PARTS OF FACE: Status: ACTIVE | Noted: 2023-10-26

## 2023-10-26 PROBLEM — D22.5 MELANOCYTIC NEVI OF TRUNK: Status: ACTIVE | Noted: 2023-10-26

## 2023-10-26 PROBLEM — D22.62 MELANOCYTIC NEVI OF LEFT UPPER LIMB, INCLUDING SHOULDER: Status: ACTIVE | Noted: 2023-10-26

## 2023-10-26 PROBLEM — D22.61 MELANOCYTIC NEVI OF RIGHT UPPER LIMB, INCLUDING SHOULDER: Status: ACTIVE | Noted: 2023-10-26

## 2023-10-26 PROCEDURE — OTHER COUNSELING: OTHER

## 2023-10-26 PROCEDURE — 17004 DESTROY PREMAL LESIONS 15/>: CPT

## 2023-10-26 PROCEDURE — OTHER MIPS QUALITY: OTHER

## 2023-10-26 PROCEDURE — OTHER BIOPSY BY SHAVE METHOD: OTHER

## 2023-10-26 PROCEDURE — OTHER REASSURANCE: OTHER

## 2023-10-26 PROCEDURE — 11102 TANGNTL BX SKIN SINGLE LES: CPT | Mod: 59

## 2023-10-26 PROCEDURE — OTHER LIQUID NITROGEN: OTHER

## 2023-10-26 PROCEDURE — 99213 OFFICE O/P EST LOW 20 MIN: CPT | Mod: 25

## 2023-10-26 ASSESSMENT — LOCATION SIMPLE DESCRIPTION DERM
LOCATION SIMPLE: LEFT MIDDLE FINGER
LOCATION SIMPLE: CHEST
LOCATION SIMPLE: LEFT CHEEK
LOCATION SIMPLE: RIGHT FOREHEAD
LOCATION SIMPLE: LEFT INDEX FINGER
LOCATION SIMPLE: RIGHT WRIST
LOCATION SIMPLE: LEFT FOREHEAD
LOCATION SIMPLE: RIGHT FOREARM
LOCATION SIMPLE: RIGHT CHEEK
LOCATION SIMPLE: NOSE
LOCATION SIMPLE: RIGHT HAND
LOCATION SIMPLE: RIGHT UPPER ARM
LOCATION SIMPLE: RIGHT NOSE
LOCATION SIMPLE: ABDOMEN
LOCATION SIMPLE: LEFT UPPER ARM
LOCATION SIMPLE: LEFT HAND
LOCATION SIMPLE: LEFT FOREARM
LOCATION SIMPLE: LEFT UPPER BACK
LOCATION SIMPLE: RIGHT EYEBROW

## 2023-10-26 ASSESSMENT — LOCATION DETAILED DESCRIPTION DERM
LOCATION DETAILED: LEFT LATERAL SUPERIOR CHEST
LOCATION DETAILED: RIGHT MEDIAL SUPERIOR CHEST
LOCATION DETAILED: LEFT PROXIMAL DORSAL MIDDLE FINGER
LOCATION DETAILED: RIGHT ANTERIOR DISTAL UPPER ARM
LOCATION DETAILED: RIGHT INFERIOR FOREHEAD
LOCATION DETAILED: RIGHT LATERAL EYEBROW
LOCATION DETAILED: LEFT INFERIOR FOREHEAD
LOCATION DETAILED: NASAL ROOT
LOCATION DETAILED: RIGHT VENTRAL PROXIMAL FOREARM
LOCATION DETAILED: LEFT MID-UPPER BACK
LOCATION DETAILED: RIGHT DORSAL WRIST
LOCATION DETAILED: NASAL DORSUM
LOCATION DETAILED: RIGHT INFERIOR LATERAL FOREHEAD
LOCATION DETAILED: LEFT ANTERIOR PROXIMAL UPPER ARM
LOCATION DETAILED: LEFT CENTRAL MALAR CHEEK
LOCATION DETAILED: RIGHT CENTRAL MALAR CHEEK
LOCATION DETAILED: RIGHT ULNAR DORSAL HAND
LOCATION DETAILED: LEFT LATERAL ABDOMEN
LOCATION DETAILED: LEFT ANTERIOR DISTAL UPPER ARM
LOCATION DETAILED: LEFT RADIAL DORSAL HAND
LOCATION DETAILED: RIGHT ANTERIOR PROXIMAL UPPER ARM
LOCATION DETAILED: RIGHT NASAL SIDEWALL
LOCATION DETAILED: LEFT PROXIMAL DORSAL INDEX FINGER
LOCATION DETAILED: NASAL SUPRATIP
LOCATION DETAILED: PERIUMBILICAL SKIN
LOCATION DETAILED: RIGHT SUPERIOR MEDIAL BUCCAL CHEEK
LOCATION DETAILED: LEFT VENTRAL PROXIMAL FOREARM
LOCATION DETAILED: RIGHT RADIAL DORSAL HAND

## 2023-10-26 ASSESSMENT — LOCATION ZONE DERM
LOCATION ZONE: FACE
LOCATION ZONE: TRUNK
LOCATION ZONE: HAND
LOCATION ZONE: ARM
LOCATION ZONE: FINGER
LOCATION ZONE: NOSE

## 2023-10-26 NOTE — PROCEDURE: MIPS QUALITY
Quality 111:Pneumonia Vaccination Status For Older Adults: Patient received any pneumococcal conjugate or polysaccharide vaccine on or after their 60th birthday and before the end of the measurement period
Quality 226: Preventive Care And Screening: Tobacco Use: Screening And Cessation Intervention: Patient screened for tobacco use and is an ex/non-smoker
Quality 47: Advance Care Plan: Advance Care Planning discussed and documented; advance care plan or surrogate decision maker documented in the medical record.
Detail Level: Detailed

## 2023-10-26 NOTE — PROCEDURE: LIQUID NITROGEN
Show Aperture Variable?: Yes
Duration Of Freeze Thaw-Cycle (Seconds): 3
Application Tool (Optional): Liquid Nitrogen Sprayer
Detail Level: Detailed
Consent: The patient's consent was obtained including but not limited to risks of crusting, scabbing, blistering, scarring, darker or lighter pigmentary change, recurrence, incomplete removal and infection.
Number Of Freeze-Thaw Cycles: 3 freeze-thaw cycles
Post-Care Instructions: I reviewed with the patient in detail post-care instructions. Patient is to wear sunprotection, and avoid picking at any of the treated lesions. Pt may apply Vaseline to crusted or scabbing areas.
Render Note In Bullet Format When Appropriate: No

## 2023-11-23 NOTE — PROCEDURE: SUPERFICIAL RADIATION TREATMENT
Initial Radiation Treatment Planning (Will Render If Bill Simulation = Yes): The patient had a complete consultation regarding all applicable modalities for the treatment of their skin cancer\\nand based on a variety of factors including the type of tumor, size, and location, the relevant medical history as\\nwell as local tissue factors, the functional status of the individual, the ability to perform necessary postoperative\\nwound instructions and the need for simultaneous treatments as well as overall wound healing status, it was\\ndetermined that the patient would begin radiation therapy treatment for skin cancer. A full simulation and\\ntreatment device design was performed including the determination and formulation of appropriate simple and\\ncomplex devices. The specific field applicator, shields, and devices both simple \\nand complex as well as the specific patient setup is outlined below. \\nThe patient was given a full consent for superficial radiation to both verbally and in writing and the full determination of\\npatient's eligibility for treatment and selection is outlined on the patient eligibility and treatment selection form.\\nThe specific superficial radiotherapy prescription was determined and was documented on the superficial\\nradiotherapy prescription form. A treatment calculation was also performed and documented on the treatment\\ncalculation form. Based on the prescription, the patient was scheduled for a series of fractional treatments Discharged

## 2024-04-26 NOTE — PROCEDURE: SUPERFICIAL RADIATION TREATMENT
Velma called from patient access at [facility]. Said she is scheduled for a PET Scan ordered by Dr. 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

## 2024-10-15 NOTE — PROCEDURE: SUPERFICIAL RADIATION TREATMENT
Comments: RTOG 0 15-Oct-2024 20:31

## 2025-06-26 NOTE — PROCEDURE: SUPERFICIAL RADIATION TREATMENT
BREAST [facility] GYN.  HPI:     GYN History:          Menarche at age 7 years         Menses monthly and regular since pt on bcp         H/o STD No.          H/o abnormal PAP no         Contraception bcp         LMP 6/20/25          Last Pap 2021          Calcium intake adequate. Vitamin D intake at 800-1000 IU/day..          H/o Gyn Surgery No         Sexual partner same; would like checked for gc/chlamydia/hiv: she and her current partner broke up and did see others but now back together.  Denies any sxs currently     Breast Complaint:          Denies : concerns.     MEDICAL HISTORY: Medical History[1]  MEDICATIONS: Current Medications[2]  ALLERGIES: Allergies[3]  FAMILY HISTORY: Family History[4]  SOCIAL HISTORY: Social History[5]    ROS:       CONSTITUTIONAL:  NAD, ALERT AND ORIENTED X 3, WELL DEVELOPED     GASTROENTEROLOGY:          no Blood in stool.  no Change in bowel habits.  no Change in stool.         FEMALE REPRODUCTIVE:          See HPI for details.  no Breast symptoms.  no Abnormal vaginal discharge.  no Dysuria.  no Hot flashes.  no Skipping periods.  no Vaginal spotting.        VITAL SIGNS:  /66   Pulse 68   Wt 56.1 kg (123 lb 9.6 oz)   BMI 21.22 kg/m²     PE:  General:   alert and oriented, in no acute distress, appears stated age, and cooperative           Abdomen: soft, non-tender, without masses or organomegaly   Vulva: normal   Vagina: normal mucosa   Cervix: no bleeding following Pap, no cervical motion tenderness, and no lesions   Uterus: normal size, non-tender, normal shape and consistency   Adnexa: normal adnexa and no mass, fullness, tenderness       Britany was seen today for gynecologic exam.  Diagnoses and all orders for this visit:  Well female exam with routine gynecological exam (Primary)  Screening for cervical cancer  -     THINPREP PAP TEST  Uses birth control  -     norgestrel-ethinyl estradioL (Cryselle, Bakari,) 0.3-30 mg-mcg tablet; Take 1 tablet by mouth once daily.  Possible  exposure to STI  -     HIV 1/2 Antigen/Antibody Screen with Reflex to Confirmation; Future  -     C. trachomatis / N. gonorrhoeae, Amplified, Urogenital; Future  -     HIV 1/2 Antigen/Antibody Screen with Reflex to Confirmation  -     C. trachomatis / N. gonorrhoeae, Amplified, Urogenital                  [1]   Past Medical History:  Diagnosis Date    ADHD 08/02/2024    COVID-19 08/02/2024    Depression 2012    Migraine 08/02/2024    Tension-type headache 08/02/2024   [2]   Current Outpatient Medications   Medication Sig Dispense Refill    amphetamine-dextroamphetamine (Adderall) 10 mg tablet 2 tablets in the AM and 1 in the early afternoon Do not fill before June 16, 2025. 30 tablet 0    ibuprofen 800 mg tablet Take 1 tablet (800 mg) by mouth every 8 hours if needed for mild pain (1 - 3).      cyclobenzaprine (Flexeril) 10 mg tablet Take 1 tablet (10 mg) by mouth as needed at bedtime for muscle spasms for up to 10 days. 10 tablet 0    norgestrel-ethinyl estradioL (Cryselle, 28,) 0.3-30 mg-mcg tablet Take 1 tablet by mouth once daily. 28 tablet 11     No current facility-administered medications for this visit.   [3]   Allergies  Allergen Reactions    Sulfamethoxazole-Trimethoprim Diarrhea    Elavil [Amitriptyline] Other     nightmares    Lexapro [Escitalopram Oxalate] Other     Apathetic; no emotions    Prozac [Fluoxetine] Other     Didn't work   [4]   Family History  Problem Relation Name Age of Onset    Stroke Mother      Mental illness Mother      Asthma Mother      Depression Mother      No Known Problems Father      No Known Problems Sister      No Known Problems Sister      No Known Problems Brother     [5]   Social History  Tobacco Use    Smoking status: Former     Types: Cigarettes    Smokeless tobacco: Never    Tobacco comments:     Vape   Vaping Use    Vaping status: Every Day    Substances: Nicotine    Devices: Disposable   Substance Use Topics    Alcohol use: Yes     Alcohol/week: 4.0 standard drinks of  alcohol     Types: 4 Standard drinks or equivalent per week    Drug use: Yes     Frequency: 7.0 times per week     Types: Marijuana      Radiation Units: cGy